# Patient Record
Sex: FEMALE | Race: WHITE | NOT HISPANIC OR LATINO | Employment: FULL TIME | URBAN - METROPOLITAN AREA
[De-identification: names, ages, dates, MRNs, and addresses within clinical notes are randomized per-mention and may not be internally consistent; named-entity substitution may affect disease eponyms.]

---

## 2017-01-06 ENCOUNTER — PATIENT MESSAGE (OUTPATIENT)
Dept: PHYSICAL MEDICINE AND REHAB | Facility: CLINIC | Age: 32
End: 2017-01-06

## 2017-01-10 ENCOUNTER — OFFICE VISIT (OUTPATIENT)
Dept: PHYSICAL MEDICINE AND REHAB | Facility: CLINIC | Age: 32
End: 2017-01-10
Payer: COMMERCIAL

## 2017-01-10 ENCOUNTER — HOSPITAL ENCOUNTER (OUTPATIENT)
Dept: RADIOLOGY | Facility: HOSPITAL | Age: 32
Discharge: HOME | End: 2017-01-10
Attending: PHYSICAL MEDICINE & REHABILITATION
Payer: COMMERCIAL

## 2017-01-10 VITALS
WEIGHT: 148.56 LBS | HEART RATE: 59 BPM | HEIGHT: 62 IN | SYSTOLIC BLOOD PRESSURE: 97 MMHG | DIASTOLIC BLOOD PRESSURE: 61 MMHG | BODY MASS INDEX: 27.34 KG/M2

## 2017-01-10 DIAGNOSIS — R29.898 WEAKNESS OF HAND: Primary | ICD-10-CM

## 2017-01-10 DIAGNOSIS — M54.2 CERVICALGIA: ICD-10-CM

## 2017-01-10 DIAGNOSIS — R29.898 WEAKNESS OF HAND: ICD-10-CM

## 2017-01-10 PROCEDURE — 72050 X-RAY EXAM NECK SPINE 4/5VWS: CPT | Mod: 26,,, | Performed by: RADIOLOGY

## 2017-01-10 PROCEDURE — 99999 PR PBB SHADOW E&M-EST. PATIENT-LVL III: CPT | Mod: PBBFAC,,, | Performed by: PHYSICAL MEDICINE & REHABILITATION

## 2017-01-10 PROCEDURE — 99213 OFFICE O/P EST LOW 20 MIN: CPT | Mod: S$GLB,,, | Performed by: PHYSICAL MEDICINE & REHABILITATION

## 2017-01-10 PROCEDURE — 72050 X-RAY EXAM NECK SPINE 4/5VWS: CPT | Mod: TC

## 2017-01-10 PROCEDURE — 1159F MED LIST DOCD IN RCRD: CPT | Mod: S$GLB,,, | Performed by: PHYSICAL MEDICINE & REHABILITATION

## 2017-01-10 RX ORDER — DICLOFENAC SODIUM 10 MG/G
4 GEL TOPICAL 4 TIMES DAILY
Qty: 2 TUBE | Refills: 6 | Status: SHIPPED | OUTPATIENT
Start: 2017-01-10 | End: 2018-04-12

## 2017-01-10 NOTE — MR AVS SNAPSHOT
[facility]  [address] [place] [address] [place] [address]  Phone: [phone]  Fax: [phone]                  Marilu Hernandez   1/10/2017 9:40 AM   Office Visit    Description:  Female : 1985   Provider:  Sharon Figueroa DO   Department:  [facility]           Reason for Visit     Migraine           Diagnoses this Visit        Comments    Weakness of hand    -  Primary     Cervicalgia                To Do List           Future Appointments        Provider Department Dept Phone    1/10/2017 10:45 AM [facility] [id number] [facility] [phone]    2017 1:00 PM [facility] MRI1 300 LB LIMIT [facility] [phone]    2017 11:40 AM Sharon Figueroa DO [facility] [phone]      Goals (5 Years of Data)     None      Follow-Up and Disposition     Return in about 4 weeks (around 2017).    Follow-up and Disposition History       These Medications        Disp Refills Start End    diclofenac sodium (VOLTAREN) 1 % Gel 2 Tube 6 1/10/2017     Apply 4 g topically 4 (four) times daily. - Topical    Pharmacy: [facility] [address] [place] [address] (N), [place] - [address] Ph #: [phone]         [facility]     [facility] On Call Nurse Care Line -  Assistance  Registered nurses in the [facility] Center provide clinical advisement, health education, appointment booking, and other advisory services.  Call for this free service at [phone].             Medications           Message regarding Medications     Verify the changes and/or additions to your medication regime listed below are the same as discussed with your clinician today.  If any of these changes or additions are incorrect, please notify your healthcare provider.        START taking these NEW medications        Refills    diclofenac sodium (VOLTAREN) 1 % Gel 6    Sig: Apply 4 g topically 4 (four) times daily.     "Class: Normal    Route: Topical           Verify that the below list of medications is an accurate representation of the medications you are currently taking.  If none reported, the list may be blank. If incorrect, please contact your healthcare provider. Carry this list with you in case of emergency.           Current Medications     diclofenac sodium (VOLTAREN) 1 % Gel Apply 4 g topically 4 (four) times daily.    ibuprofen (ADVIL,MOTRIN) 200 MG tablet Take 800 mg by mouth nightly as needed for Pain.           Clinical Reference Information           Vital Signs - Last Recorded  Most recent update: 1/10/2017  9:54 AM by Annabel López MA    BP Pulse Ht Wt BMI    97/61 (!) 59 5' 2" (1.575 m) 67.4 kg (148 lb 9.4 oz) 27.18 kg/m2      Blood Pressure          Most Recent Value    BP  97/61      Allergies as of 1/10/2017     No Known Allergies      Immunizations Administered on Date of Encounter - 1/10/2017     None      Orders Placed During Today's Visit     Future Labs/Procedures Expected by Expires    MRI Cervical Spine Without Contrast  1/10/2017 1/10/2018    X-Ray Cervical Spine Complete 5 view  1/10/2017 1/10/2018      "
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
## 2017-01-16 ENCOUNTER — HOSPITAL ENCOUNTER (OUTPATIENT)
Dept: RADIOLOGY | Facility: HOSPITAL | Age: 32
Discharge: HOME | End: 2017-01-16
Attending: PHYSICAL MEDICINE & REHABILITATION
Payer: COMMERCIAL

## 2017-01-16 DIAGNOSIS — R29.898 WEAKNESS OF HAND: ICD-10-CM

## 2017-01-16 PROCEDURE — 72141 MRI NECK SPINE W/O DYE: CPT | Mod: 26,,, | Performed by: RADIOLOGY

## 2017-01-16 PROCEDURE — 72141 MRI NECK SPINE W/O DYE: CPT | Mod: TC

## 2017-01-17 ENCOUNTER — OFFICE VISIT (OUTPATIENT)
Dept: PHYSICAL MEDICINE AND REHAB | Facility: CLINIC | Age: 32
End: 2017-01-17
Payer: COMMERCIAL

## 2017-01-17 VITALS
WEIGHT: 147.94 LBS | HEIGHT: 62 IN | DIASTOLIC BLOOD PRESSURE: 56 MMHG | HEART RATE: 72 BPM | BODY MASS INDEX: 27.22 KG/M2 | SYSTOLIC BLOOD PRESSURE: 91 MMHG

## 2017-01-17 DIAGNOSIS — M54.2 CERVICALGIA: ICD-10-CM

## 2017-01-17 PROCEDURE — 1159F MED LIST DOCD IN RCRD: CPT | Mod: S$GLB,,, | Performed by: PHYSICAL MEDICINE & REHABILITATION

## 2017-01-17 PROCEDURE — 20553 NJX 1/MLT TRIGGER POINTS 3/>: CPT | Mod: S$GLB,,, | Performed by: PHYSICAL MEDICINE & REHABILITATION

## 2017-01-17 PROCEDURE — 99999 PR PBB SHADOW E&M-EST. PATIENT-LVL II: CPT | Mod: PBBFAC,,, | Performed by: PHYSICAL MEDICINE & REHABILITATION

## 2017-01-17 PROCEDURE — 99213 OFFICE O/P EST LOW 20 MIN: CPT | Mod: 25,S$GLB,, | Performed by: PHYSICAL MEDICINE & REHABILITATION

## 2017-01-17 RX ORDER — LIDOCAINE HYDROCHLORIDE 10 MG/ML
9 INJECTION INFILTRATION; PERINEURAL ONCE
Status: COMPLETED | OUTPATIENT
Start: 2017-01-17 | End: 2017-01-17

## 2017-01-17 RX ADMIN — LIDOCAINE HYDROCHLORIDE 9 ML: 10 INJECTION INFILTRATION; PERINEURAL at 02:01

## 2017-01-17 NOTE — MR AVS SNAPSHOT
[facility]  [address] [place] [address]  [place] [address]  Phone: [phone]  Fax: [phone]                  Marilu Hernandez   2017 1:40 PM   Office Visit    Description:  Female : 1985   Provider:  Sharon Figueroa DO   Department:  [facility]           Reason for Visit     Follow-up     Neck Pain           Diagnoses this Visit        Comments    Cervicalgia                To Do List           Future Appointments        Provider Department Dept Phone    2017 11:40 AM Sharon Figueroa DO [facility] [phone]      Goals (5 Years of Data)     None      Follow-Up and Disposition     Return in about 4 weeks (around 2017).      [facility]     [facility] On Call Nurse [facility] Line - 24/7 Assistance  Registered nurses in the [facility] On Call Center provide clinical advisement, health education, appointment booking, and other advisory services.  Call for this free service at [phone].             Medications           Message regarding Medications     Verify the changes and/or additions to your medication regime listed below are the same as discussed with your clinician today.  If any of these changes or additions are incorrect, please notify your healthcare provider.        These medications were administered today        Dose Freq    lidocaine HCL 10 mg/ml (1%) injection 9 mL 9 mL Once    Si mLs by Other route once.    Class: Normal    Route: Other           Verify that the below list of medications is an accurate representation of the medications you are currently taking.  If none reported, the list may be blank. If incorrect, please contact your healthcare provider. Carry this list with you in case of emergency.           Current Medications     diclofenac sodium (VOLTAREN) 1 % Gel Apply 4 g topically 4 (four) times daily.    ibuprofen (ADVIL,MOTRIN) 200 MG tablet Take 800 mg by mouth nightly as needed for  "Pain.           Clinical Reference Information           Vital Signs - Last Recorded  Most recent update: 1/17/2017  2:11 PM by Annabel López MA    BP Pulse Ht Wt BMI    (!) 91/56 72 5' 2" (1.575 m) 67.1 kg (147 lb 14.9 oz) 27.06 kg/m2      Blood Pressure          Most Recent Value    BP  (!)  91/56      Allergies as of 1/17/2017     No Known Allergies      Immunizations Administered on Date of Encounter - 1/17/2017     None      Administrations This Visit     lidocaine HCL 10 mg/ml (1%) injection 9 mL     Admin Date Action Dose Route Administered By             01/17/2017 Given 9 mL Other Sharon Figueroa DO                      "

## 2017-01-17 NOTE — PROGRESS NOTES
HPI:  Patient is a 31 y.o. year old female w. Neck pain and headaches. Her last eval was 1/10 we recc trigger pt injections but she declined, she has now reconsidered.    IMAGING    MRI CERVICAL SPINE WITHOUT CONTRAST    COMPARISON:  None    TECHNIQUE:  Sagittal T1, T2, and STIR;  axial T2 and 3D GRE sequences through the cervical spine were acquired without contrast.      FINDINGS:  The vertebral body alignment are within normal limits.  Marrow signal is appropriate.  The cervical cord is normal in contour, caliber, and signal characteristics.    C2-3:  No disc herniation, spinal canal narrowing, or neuroforaminal narrowing.    C3-4:  There is a very small broad-based posterior disc osteophyte complex formation which results in no significant spinal canal or neuroforaminal narrowing.    C4-5:  There is small broad-based posterior disc ostomy complex formation accompanied by uncovertebral spurring, resulting in mild bilateral neuroforaminal narrowing.  There is also mild spinal canal narrowing with effacement of the ventral CSF space.  No impression upon the cord.    C5-6:  There is mild right neuroforaminal narrowing primarily due to uncovertebral spurring.    C6-7:  No disc herniation, spinal canal narrowing, or neuroforaminal narrowing.    C7-T1:  No disc herniation, spinal canal narrowing, or neuroforaminal narrowing.   Impression     Mild degenerative cervical spondylosis resulting in mild spinal canal and neuroforaminal narrowing as above.           AP, AP open-mouth odontoid, lateral, bilateral BP cervical spine obtained    The odontoid is intact.  There is no abnormal prevertebral soft tissue swelling.  There is a mild reversal of the usual cervical lordosis.  Vertebral body heights and alignment are maintained without acute compression or subluxation.  Heights of the intervertebral disc spaces appear maintained.   Impression       Reversal of the usual cervical lordosis.  No acute compression or  "subluxation.           Past Medical History   Diagnosis Date    Dizziness     Endometriosis of uterus     Gastritis     Headache     Numbness and tingling in both hands      Past Surgical History   Procedure Laterality Date    Hysterectomy       Family History   Problem Relation Age of Onset    Diabetes Maternal Grandmother      Social History     Social History    Marital status:      Spouse name: N/A    Number of children: N/A    Years of education: N/A     Social History Main Topics    Smoking status: Former Smoker    Smokeless tobacco: Never Used    Alcohol use Yes    Drug use: No    Sexual activity: Yes     Other Topics Concern    Not on file     Social History Narrative       Review of patient's allergies indicates:  No Known Allergies    Current Outpatient Prescriptions:     diclofenac sodium (VOLTAREN) 1 % Gel, Apply 4 g topically 4 (four) times daily., Disp: 2 Tube, Rfl: 6    ibuprofen (ADVIL,MOTRIN) 200 MG tablet, Take 800 mg by mouth nightly as needed for Pain., Disp: , Rfl:           Review of Systems  No nausea, vomiting, fevers, Chills , contipation, diarrhea or sweats    Physical Exam:      Vitals:    01/17/17 1409   BP: (!) 91/56   Pulse: 72   alert and oriented ×4 follows commands answers all questions appropriately,affect wnl  Manual muscle test 5 out of 5 sensation to light touch grossly intact  +excruciate tenderness b/l CERVICAL paraspinals   Antalgic gait  No C/C/E      Assessment:  Cervical myofascial pain  B/l occipital neuralgia  Mild cervical spondylosis    Plan:    Reviewed images w. Pt.  +  PROCEDURE NOTE:Risk and benefit of trigger point injections given to pt. Injections performed w. A" 25G needle after sterile prep w. Betadine, verbal consent obtained . NO complications. b/l trapezius, splenius cap and lev scapulae were inected with a total of 3ML of 1% Lidocaine    - pt. Declined occipital nerve block will leave for next encounter        "

## 2017-03-13 ENCOUNTER — OFFICE VISIT (OUTPATIENT)
Dept: PHYSICAL MEDICINE AND REHAB | Facility: CLINIC | Age: 32
End: 2017-03-13
Payer: COMMERCIAL

## 2017-03-13 VITALS
WEIGHT: 136 LBS | TEMPERATURE: 98 F | BODY MASS INDEX: 25.03 KG/M2 | DIASTOLIC BLOOD PRESSURE: 62 MMHG | HEART RATE: 76 BPM | HEIGHT: 62 IN | SYSTOLIC BLOOD PRESSURE: 98 MMHG

## 2017-03-13 DIAGNOSIS — R42 VERTIGO: Primary | ICD-10-CM

## 2017-03-13 DIAGNOSIS — M54.81 BILATERAL OCCIPITAL NEURALGIA: ICD-10-CM

## 2017-03-13 PROCEDURE — 99999 PR PBB SHADOW E&M-EST. PATIENT-LVL III: CPT | Mod: PBBFAC,,, | Performed by: PHYSICAL MEDICINE & REHABILITATION

## 2017-03-13 PROCEDURE — 1160F RVW MEDS BY RX/DR IN RCRD: CPT | Mod: S$GLB,,, | Performed by: PHYSICAL MEDICINE & REHABILITATION

## 2017-03-13 PROCEDURE — 99214 OFFICE O/P EST MOD 30 MIN: CPT | Mod: 25,S$GLB,, | Performed by: PHYSICAL MEDICINE & REHABILITATION

## 2017-03-13 PROCEDURE — 64405 NJX AA&/STRD GR OCPL NRV: CPT | Mod: 50,S$GLB,, | Performed by: PHYSICAL MEDICINE & REHABILITATION

## 2017-03-13 RX ORDER — LIDOCAINE HYDROCHLORIDE 10 MG/ML
3 INJECTION INFILTRATION; PERINEURAL
Status: COMPLETED | OUTPATIENT
Start: 2017-03-13 | End: 2017-03-13

## 2017-03-13 RX ADMIN — LIDOCAINE HYDROCHLORIDE 3 ML: 10 INJECTION INFILTRATION; PERINEURAL at 08:03

## 2017-03-13 NOTE — MR AVS SNAPSHOT
[facility]  [address] [place] [address]  [place] [address]  Phone: [phone]  Fax: [phone]                  Marilu Hernandez   3/13/2017 8:00 AM   Office Visit    Description:  Female : 1985   Provider:  Sharon Figueroa DO   Department:  [facility] Med/Rehab           Reason for Visit     Follow-up           Diagnoses this Visit        Comments    Vertigo    -  Primary     Bilateral occipital neuralgia                To Do List           Future Appointments        Provider Department Dept Phone    2017 8:00 AM Sharon Figueroa DO [facility] Med[facility]Rehab [phone]      Goals (5 Years of Data)     None      [facility]     [facility] Nurse [facility] Line -  Assistance  Registered nurses in the [facility] On Call Center provide clinical advisement, health education, appointment booking, and other advisory services.  Call for this free service at [phone].             Medications           Message regarding Medications     Verify the changes and/or additions to your medication regime listed below are the same as discussed with your clinician today.  If any of these changes or additions are incorrect, please notify your healthcare provider.        These medications were administered today        Dose Freq    lidocaine HCL 10 mg/ml (1%) injection 3 mL 3 mL Clinic/HOD 1 time    Sig: 3 mLs by Other route one time.    Class: Normal    Route: Other           Verify that the below list of medications is an accurate representation of the medications you are currently taking.  If none reported, the list may be blank. If incorrect, please contact your healthcare provider. Carry this list with you in case of emergency.           Current Medications     diclofenac sodium (VOLTAREN) 1 % Gel Apply 4 g topically 4 (four) times daily.    ibuprofen (ADVIL,MOTRIN) 200 MG tablet Take 800 mg by mouth nightly as needed for Pain.           Clinical  "Reference Information           Your Vitals Were     BP Pulse Temp Height Weight BMI    98/62 (BP Location: Right arm, Patient Position: Sitting, BP Method: Automatic) 76 98.1 °F (36.7 °C) 5' 2" (1.575 m) 61.7 kg (136 lb 0.4 oz) 24.88 kg/m2      Blood Pressure          Most Recent Value    BP  98/62      Allergies as of 3/13/2017     No Known Allergies      Immunizations Administered on Date of Encounter - 3/13/2017     None      Orders Placed During Today's Visit     Future Labs/Procedures Expected by Expires    MRI Brain With Contrast  3/13/2017 3/13/2018      Administrations This Visit     lidocaine HCL 10 mg/ml (1%) injection 3 mL     Admin Date Action Dose Route Administered By             03/13/2017 Given 3 mL Other Sharon Figueroa, DO                      Language Assistance Services     ATTENTION: Language assistance services are available, free of charge. Please call [phone].      ATENCIÓN: Si habla chevy, tiene a gregory disposición servicios gratuitos de asistencia lingüística. Llame al [phone].     CHÚ Ý: N?u b?n nói Ti?ng Vi?t, có các d?ch v? h? tr? ngôn ng? mi?n phí dành cho b?n. G?i s? [phone].         [facility] complies with applicable Federal civil rights laws and does not discriminate on the basis of race, color, national origin, age, disability, or sex.        "

## 2017-03-13 NOTE — PROGRESS NOTES
HPI:  Patient is a 31 y.o. year old female w. Neck pain and headaches. Neck pain improved after trigger pt injections. Pt. Recently had an episode of severe vertigo which lasted for 2 days and lightheadedness. It was previously noted in an MRI of the brain she had some lesions unspecified. She is requesting a repeat mri of brain.     Comparison study: 7/26/2013    Ultrasound was multiplanar imaging of the brain was obtained without and with contrast.  6 cc gadovist was injected intravenously, MS protocol with patient returning to the Department for sagittal T2 flair following the initial images.    There is no restricted diffusion suggesting acute infarct.  The craniocervical junction is normal in appearance.  There is normal vascular flow void in major, central intracranial vessels.  Ventricles, sulci, fissures are unremarkable in appearance for the patient's age..  There is no abnormal contrast enhancement or intracranial mass or mass effect.    There are few small foci of increased signal in the white matter on the FLAIR sequence.  There is one on the axial images on the left series 8 image 6 today that is not definitively seen on prior axial images but on sagittal CT images do not appear significant changed, and also not significantly changed compared to earlier exam 1/30/2013   Impression       Small foci of the increased signal white matter on flair sequence not significantly changed compared to prior exams differential including demyelinating process such as multiple sclerosis and microvascular ischemic change              ______________________________________     Electronically signed by: RUDY FRANCE MD  Date: 06/14/16  Time: 12:12     Encounter   View Encounter            Past Medical History:   Diagnosis Date    Dizziness     Endometriosis of uterus     Gastritis     Headache     Numbness and tingling in both hands      Past Surgical History:   Procedure Laterality Date    HYSTERECTOMY    "    Family History   Problem Relation Age of Onset    Diabetes Maternal Grandmother      Social History     Social History    Marital status:      Spouse name: N/A    Number of children: N/A    Years of education: N/A     Social History Main Topics    Smoking status: Former Smoker    Smokeless tobacco: Never Used    Alcohol use Yes    Drug use: No    Sexual activity: Yes     Other Topics Concern    None     Social History Narrative       Review of patient's allergies indicates:  No Known Allergies    Current Outpatient Prescriptions:     diclofenac sodium (VOLTAREN) 1 % Gel, Apply 4 g topically 4 (four) times daily., Disp: 2 Tube, Rfl: 6    ibuprofen (ADVIL,MOTRIN) 200 MG tablet, Take 800 mg by mouth nightly as needed for Pain., Disp: , Rfl:           Review of Systems  No nausea, vomiting, fevers, Chills , contipation, diarrhea or sweats    Physical Exam:      Vitals:    03/13/17 0813   BP: 98/62   Pulse: 76   Temp: 98.1 °F (36.7 °C)       alert and oriented ×4 follows commands answers all questions appropriately,affect wnl  Manual muscle test 5 out of 5 sensation to light touch grossly intact  +excruciate tenderness b/l occiput  Antalgic gait  No C/C/E      Assessment:  Cervical myosfascial pain improved after trigger pt  B/l occipital neuralagi    Plan:  Occipital nerve blocks  Mri brain repeat d/t lesions and f/u imaging w. neuro    PROCEDURE NOTE:   Risk and benefits of occipital nerve block reviewed with patient. Injections performed after verbal consent obtained. A 25G 1.5" needle utilized.  B/l 0cciput was injected with a total of 3 ml of 1% lidocaine. No complications      "

## 2017-04-13 ENCOUNTER — OFFICE VISIT (OUTPATIENT)
Dept: PHYSICAL MEDICINE AND REHAB | Facility: CLINIC | Age: 32
End: 2017-04-13
Payer: COMMERCIAL

## 2017-04-13 VITALS
BODY MASS INDEX: 26.33 KG/M2 | HEIGHT: 62 IN | HEART RATE: 76 BPM | DIASTOLIC BLOOD PRESSURE: 79 MMHG | WEIGHT: 143.06 LBS | TEMPERATURE: 98 F | SYSTOLIC BLOOD PRESSURE: 113 MMHG

## 2017-04-13 DIAGNOSIS — R51.9 HEADACHE, UNSPECIFIED HEADACHE TYPE: Primary | ICD-10-CM

## 2017-04-13 PROCEDURE — 99213 OFFICE O/P EST LOW 20 MIN: CPT | Mod: S$GLB,,, | Performed by: PHYSICAL MEDICINE & REHABILITATION

## 2017-04-13 PROCEDURE — 99999 PR PBB SHADOW E&M-EST. PATIENT-LVL III: CPT | Mod: PBBFAC,,, | Performed by: PHYSICAL MEDICINE & REHABILITATION

## 2017-04-13 PROCEDURE — 1160F RVW MEDS BY RX/DR IN RCRD: CPT | Mod: S$GLB,,, | Performed by: PHYSICAL MEDICINE & REHABILITATION

## 2017-04-13 NOTE — MR AVS SNAPSHOT
[facility]  [address] [place] [address] [place] [address]  Phone: [phone]  Fax: [phone]                  Marilu Hernandez   2017 8:00 AM   Office Visit    Description:  Female : 1985   Provider:  Sharon Figueroa DO   Department:  [facility] Med[facility]Rehab           Reason for Visit     Follow-up                To Do List           Future Appointments        Provider Department Dept Phone    2017 8:30 AM [facility] MRI1 300 LB LIMIT [facility] [phone]    2017 9:40 AM Cisco Coles Jr., MD [facility] Neurology [phone]    2017 8:00 AM Sharon Figueroa DO [facility] [phone]      Goals (5 Years of Data)     None      [facility]     [facility] Nurse Care Line -  Assistance  Unless otherwise directed by your provider, please contact [facility], our nurse care line that is available for  assistance.     Registered nurses in the [facility] Center provide: appointment scheduling, clinical advisement, health education, and other advisory services.  Call: [phone] (toll free)               Medications           Message regarding Medications     Verify the changes and/or additions to your medication regime listed below are the same as discussed with your clinician today.  If any of these changes or additions are incorrect, please notify your healthcare provider.             Verify that the below list of medications is an accurate representation of the medications you are currently taking.  If none reported, the list may be blank. If incorrect, please contact your healthcare provider. Carry this list with you in case of emergency.           Current Medications     diclofenac sodium (VOLTAREN) 1 % Gel Apply 4 g topically 4 (four) times daily.    ibuprofen (ADVIL,MOTRIN) 200 MG tablet Take 800 mg by mouth nightly as needed for Pain.           Clinical Reference  "Information           Your Vitals Were     BP Pulse Temp Height Weight BMI    113/79 76 98.1 °F (36.7 °C) 5' 2" (1.575 m) 64.9 kg (143 lb 1.3 oz) 26.17 kg/m2      Blood Pressure          Most Recent Value    BP  113/79      Allergies as of 4/13/2017     No Known Allergies      Immunizations Administered on Date of Encounter - 4/13/2017     None      Language Assistance Services     ATTENTION: Language assistance services are available, free of charge. Please call [phone].      ATENCIÓN: Si habla español, tiene a gregory disposición servicios gratuitos de asistencia lingüística. Llame al [phone].     EDMAR Ý: N?u b?n nói Ti?ng Vi?t, có các d?ch v? h? tr? ngôn ng? mi?n phí dành cho b?n. G?i s? [phone].         [facility] complies with applicable Federal civil rights laws and does not discriminate on the basis of race, color, national origin, age, disability, or sex.        "

## 2017-04-13 NOTE — PROGRESS NOTES
HPI:  Patient is a 31 y.o. year old female w. Recurrent headaches and abnormality on previous MRI of brain. She has followed neurology who does not think she has MS . Radiology did recommend monitoring plaques on mri of brain. We had ordered an mri but it was not scheuled. Headaches improved 75% w. B/l occipital nerve blocks.      Past Medical History:   Diagnosis Date    Dizziness     Endometriosis of uterus     Gastritis     Headache     Numbness and tingling in both hands      Past Surgical History:   Procedure Laterality Date    HYSTERECTOMY       Family History   Problem Relation Age of Onset    Diabetes Maternal Grandmother      Social History     Social History    Marital status:      Spouse name: N/A    Number of children: N/A    Years of education: N/A     Social History Main Topics    Smoking status: Former Smoker    Smokeless tobacco: Never Used    Alcohol use Yes    Drug use: No    Sexual activity: Yes     Other Topics Concern    None     Social History Narrative       Review of patient's allergies indicates:  No Known Allergies    Current Outpatient Prescriptions:     diclofenac sodium (VOLTAREN) 1 % Gel, Apply 4 g topically 4 (four) times daily., Disp: 2 Tube, Rfl: 6    ibuprofen (ADVIL,MOTRIN) 200 MG tablet, Take 800 mg by mouth nightly as needed for Pain., Disp: , Rfl:     Review of Systems  No nausea, vomiting, fevers, Chills , contipation, diarrhea or sweats      Physical Exam:      Vitals:    04/13/17 0812   BP: 113/79   Pulse: 76   Temp: 98.1 °F (36.7 °C)   alert and oriented ×4 follows commands answers all questions appropriately,affect wnl  Manual muscle test 5 out of 5 sensation to light touch grossly intact  +mild tenderness b/l cervical paraspinals, no tenderness of b/l occiput  Nl gait  No C/C/E      Assessment  b/l occipital neuralgia-  Improved after blocks  Abnormal mri of brain    Plan:  Schedule mri of brain  She declined any more injections as she has needle  phobia and her pain is much improved

## 2017-04-19 DIAGNOSIS — R51.9 HEADACHE, UNSPECIFIED HEADACHE TYPE: Primary | ICD-10-CM

## 2017-04-21 ENCOUNTER — TELEPHONE (OUTPATIENT)
Dept: PHYSICAL MEDICINE AND REHAB | Facility: CLINIC | Age: 32
End: 2017-04-21

## 2017-04-21 ENCOUNTER — HOSPITAL ENCOUNTER (OUTPATIENT)
Dept: RADIOLOGY | Facility: HOSPITAL | Age: 32
Discharge: HOME | End: 2017-04-21
Attending: PHYSICAL MEDICINE & REHABILITATION
Payer: COMMERCIAL

## 2017-04-21 DIAGNOSIS — R51.9 HEADACHE, UNSPECIFIED HEADACHE TYPE: ICD-10-CM

## 2017-04-21 PROCEDURE — 70553 MRI BRAIN STEM W/O & W/DYE: CPT | Mod: 26,,, | Performed by: RADIOLOGY

## 2017-04-21 PROCEDURE — 70553 MRI BRAIN STEM W/O & W/DYE: CPT | Mod: TC

## 2017-04-21 PROCEDURE — 25500020 PHARM REV CODE 255: Performed by: PHYSICAL MEDICINE & REHABILITATION

## 2017-04-21 PROCEDURE — A9585 GADOBUTROL INJECTION: HCPCS | Performed by: PHYSICAL MEDICINE & REHABILITATION

## 2017-04-21 RX ORDER — GADOBUTROL 604.72 MG/ML
INJECTION INTRAVENOUS
Status: DISPENSED
Start: 2017-04-21 | End: 2017-04-21

## 2017-04-21 RX ORDER — GADOBUTROL 604.72 MG/ML
6 INJECTION INTRAVENOUS
Status: COMPLETED | OUTPATIENT
Start: 2017-04-21 | End: 2017-04-21

## 2017-04-21 RX ADMIN — GADOBUTROL 6 ML: 604.72 INJECTION INTRAVENOUS at 09:04

## 2017-04-21 NOTE — TELEPHONE ENCOUNTER
----- Message from Ida Solano sent at 4/20/2017  8:31 AM CDT -----  Contact: pt [phone]  Call placed to pod patient called and states she is returning a call back from your office yesterday about her MRI for tomorrow

## 2017-05-23 ENCOUNTER — TELEPHONE (OUTPATIENT)
Dept: NEUROLOGY | Facility: CLINIC | Age: 32
End: 2017-05-23

## 2017-06-06 ENCOUNTER — TELEPHONE (OUTPATIENT)
Dept: NEUROLOGY | Facility: CLINIC | Age: 32
End: 2017-06-06

## 2017-07-07 ENCOUNTER — OFFICE VISIT (OUTPATIENT)
Dept: PHYSICAL MEDICINE AND REHAB | Facility: CLINIC | Age: 32
End: 2017-07-07
Payer: COMMERCIAL

## 2017-07-07 VITALS
HEIGHT: 62 IN | SYSTOLIC BLOOD PRESSURE: 98 MMHG | BODY MASS INDEX: 27.49 KG/M2 | DIASTOLIC BLOOD PRESSURE: 67 MMHG | WEIGHT: 149.38 LBS | HEART RATE: 77 BPM

## 2017-07-07 DIAGNOSIS — M79.10 MYALGIA: ICD-10-CM

## 2017-07-07 DIAGNOSIS — M54.2 CERVICALGIA: ICD-10-CM

## 2017-07-07 DIAGNOSIS — M54.81 OCCIPITAL NEURALGIA OF LEFT SIDE: ICD-10-CM

## 2017-07-07 PROCEDURE — 99214 OFFICE O/P EST MOD 30 MIN: CPT | Mod: 25,S$GLB,, | Performed by: PHYSICAL MEDICINE & REHABILITATION

## 2017-07-07 PROCEDURE — 20553 NJX 1/MLT TRIGGER POINTS 3/>: CPT | Mod: 59,S$GLB,, | Performed by: PHYSICAL MEDICINE & REHABILITATION

## 2017-07-07 PROCEDURE — 64405 NJX AA&/STRD GR OCPL NRV: CPT | Mod: LT,S$GLB,, | Performed by: PHYSICAL MEDICINE & REHABILITATION

## 2017-07-07 PROCEDURE — 99999 PR PBB SHADOW E&M-EST. PATIENT-LVL III: CPT | Mod: PBBFAC,,, | Performed by: PHYSICAL MEDICINE & REHABILITATION

## 2017-07-07 RX ORDER — LORAZEPAM 0.5 MG/1
0.5 TABLET ORAL 2 TIMES DAILY PRN
COMMUNITY
End: 2018-04-12 | Stop reason: SDUPTHER

## 2017-07-07 RX ORDER — ZOLPIDEM TARTRATE 5 MG/1
5 TABLET ORAL NIGHTLY PRN
COMMUNITY
End: 2018-04-12 | Stop reason: SDUPTHER

## 2017-07-07 RX ORDER — LIDOCAINE HYDROCHLORIDE 10 MG/ML
6 INJECTION INFILTRATION; PERINEURAL ONCE
Status: COMPLETED | OUTPATIENT
Start: 2017-07-07 | End: 2017-07-07

## 2017-07-07 RX ADMIN — LIDOCAINE HYDROCHLORIDE 6 ML: 10 INJECTION INFILTRATION; PERINEURAL at 12:07

## 2017-07-07 NOTE — PROGRESS NOTES
HPI:  Patient is a 32 y.o. year old female w. Neck pain and headaches. She responds very well w. Trigger pt injections and occipital nerve blocks. The headache is primarily on the left side. Her last injections were done in April. I also ordered an mri of brain to follow up on previous abnormal one suggesting MS. [facility]    Sagittal and axial images are obtained with T1 weighting.  Additional axial images are obtained with T2, flair, diffusion weighting and ADC map.  Following the administration of IV gadolinium contrast 6mls, axial and coronal images are obtained.    The general brain anatomy appears within normal limits.  There is normal medulla, alexandro, brainstem, basal ganglia, corpus callosum, cerebral and cerebellar lobar structure.  The pituitary is not enlarged.  The internal auditory canals are sharp.  The basal cisterns are clear and symmetric.    There is no mass-effect or midline shift.  The ventricles are of normal size and symmetric.  The gray-white matter differentiation is normal.  Within the brain parenchyma there is a 5 mm focus of elevated signal intensity on flair weighting in the deep white matter of the fact that  left frontal lobe.  There are few much smaller 2-3 mm lesions in both frontal lobes more peripherally.  These do not see display acute signal on diffusion weighting and did not have contrast enhancement.  The appearance is most consistent with deep white matter ischemic changes.  In this age group however multiple sclerosis is not ruled out.  Other focal lesions particularly in the periventricular area however are not seen.    Within the rest of the brain parenchyma hyper or hypodense areas consistent with tumor, CVA, edema or hemorrhage are not seen.  Following administration of gadolinium contrast, no abnormal areas of contrast enhancement are seen.   Impression     A 5 mm focus and several 2-3 mm foci of elevated flair signal in the frontal lobes bilaterally  are most consistent with deep white matter ischemic changes.  In this age group however multiple sclerosis is not ruled out.  No acute lesions or contrast enhancing lesions are seen.  The rest of MRI of the brain with and without contrast is negative       Sagittal and axial images are obtained with T1 weighting.  Additional axial images are obtained with T2, flair, diffusion weighting and ADC map.  Following the administration of IV Multihance gadolinium contrast 15mls, axial and coronal images are   obtained.    The general brain anatomy appears within normal limits.  There is normal medulla, alexandro, brainstem, basal ganglia, corpus callosum, cerebral and cerebellar lobar structure.  The pituitary is not enlarged.  The internal auditory canals are sharp.  The   basal cisterns are clear and symmetric.    There is no mass-effect or midline shift.  The ventricles are of normal size and symmetric.  The gray-white matter differentiation is normal.  Within the brain parenchyma two foci of elevated signal intensity, one in the right frontal lobe at the   gray-white matter junction and one in the deep white matter of the left frontal lobe are identified.  These are best seen on flair weighted imaging.  Neither show acute signal on diffusion weighting.  These could represent small foci of deep white matter   ischemic change but multiple sclerosis cannot be ruled out.      Other focal lesions including around the corpus callosum are not seen..  Larger areas of increased or decreased signal intensity consistent with tumor, CVA or hemorrhage is not seen.  Following administration of gadolinium contrast, no abnormal areas of contrast enhancement are seen.   Impression      Two small foci of abnormal signal intensity one in each frontal lobe are identified.  These could represent deep white matter ischemic changes but multiple sclerosis is not ruled out.  The rest MRI of the brain with and without contrast is negative            Past Medical History:   Diagnosis Date    Dizziness     Endometriosis of uterus     Gastritis     Headache     Numbness and tingling in both hands      Past Surgical History:   Procedure Laterality Date    HYSTERECTOMY       Family History   Problem Relation Age of Onset    Diabetes Maternal Grandmother      Social History     Social History    Marital status:      Spouse name: N/A    Number of children: N/A    Years of education: N/A     Social History Main Topics    Smoking status: Former Smoker    Smokeless tobacco: Never Used    Alcohol use Yes    Drug use: No    Sexual activity: Yes     Other Topics Concern    Not on file     Social History Narrative    No narrative on file       Review of patient's allergies indicates:  No Known Allergies    Current Outpatient Prescriptions:     diclofenac sodium (VOLTAREN) 1 % Gel, Apply 4 g topically 4 (four) times daily., Disp: 2 Tube, Rfl: 6    ibuprofen (ADVIL,MOTRIN) 200 MG tablet, Take 800 mg by mouth nightly as needed for Pain., Disp: , Rfl:     lorazepam (ATIVAN) 0.5 MG tablet, Take 0.5 mg by mouth every 6 (six) hours as needed for Anxiety., Disp: , Rfl:     zolpidem (AMBIEN) 5 MG Tab, Take 5 mg by mouth nightly as needed., Disp: , Rfl:         Review of Systems  No nausea, vomiting, fevers, Chills , contipation, diarrhea or sweats      Physical Exam:      Vitals:    07/07/17 0809   BP: 98/67   Pulse: 77     alert and oriented ×4 follows commands answers all questions appropriately,affect wnl  Manual muscle test 5 out of 5 sensation to light touch grossly intact  +excruciate tenderness b/l cervical paraspinals and b/l occiput  nl gait  No C/C/E    Assessment:  Cervicalgia myofascial  Left occipital neuralgia  Abnormal mri suggestive of MS    Plan:    Trigger pt injections today  Left occipital nerve block  Follow up w.neuro regarding abnormal lesions  Schedule occipital nerve block+hydrodissection under US since she is only getting  "temporary relief from the above wout guidance- will do one side at a time      PROCEDURE NOTE:Risk and benefit of trigger point injections given to pt. Injections performed w. A" 25G needle after sterile prep w. Betadine, verbal consent obtained . NO complications.b/l trapezius, splenius cap and lev scapulae were inected with a total of 3ML of 1% Lidocaine    PROCEDURE NOTE:   Risk and benefits of occipital nerve block reviewed with patient. Injections performed after verbal consent obtained. A 25G 1.5" needle utilized.  The left occiput was injected with a total of 3 ml of 1% lidocaine. No complications        "

## 2017-10-09 PROBLEM — F51.01 PRIMARY INSOMNIA: Status: ACTIVE | Noted: 2017-10-09

## 2017-10-09 PROBLEM — F41.9 ANXIETY: Status: ACTIVE | Noted: 2017-10-09

## 2018-04-12 ENCOUNTER — OFFICE VISIT (OUTPATIENT)
Dept: PRIMARY CARE CLINIC | Facility: CLINIC | Age: 33
End: 2018-04-12
Payer: COMMERCIAL

## 2018-04-12 VITALS
BODY MASS INDEX: 26.31 KG/M2 | OXYGEN SATURATION: 99 % | HEIGHT: 62 IN | WEIGHT: 143 LBS | DIASTOLIC BLOOD PRESSURE: 67 MMHG | TEMPERATURE: 98 F | SYSTOLIC BLOOD PRESSURE: 107 MMHG | RESPIRATION RATE: 18 BRPM | HEART RATE: 62 BPM

## 2018-04-12 DIAGNOSIS — Z00.00 ANNUAL PHYSICAL EXAM: ICD-10-CM

## 2018-04-12 DIAGNOSIS — F41.9 ANXIETY: Primary | ICD-10-CM

## 2018-04-12 DIAGNOSIS — F51.01 PRIMARY INSOMNIA: ICD-10-CM

## 2018-04-12 DIAGNOSIS — Z13.6 ENCOUNTER FOR SCREENING FOR CARDIOVASCULAR DISORDERS: ICD-10-CM

## 2018-04-12 PROCEDURE — 99213 OFFICE O/P EST LOW 20 MIN: CPT | Mod: S$GLB,,, | Performed by: FAMILY MEDICINE

## 2018-04-12 PROCEDURE — 99999 PR PBB SHADOW E&M-EST. PATIENT-LVL III: CPT | Mod: PBBFAC,,, | Performed by: FAMILY MEDICINE

## 2018-04-12 RX ORDER — LORAZEPAM 0.5 MG/1
0.5 TABLET ORAL 2 TIMES DAILY PRN
Qty: 60 TABLET | Refills: 2 | Status: SHIPPED | OUTPATIENT
Start: 2018-04-12 | End: 2018-07-21

## 2018-04-12 RX ORDER — ZOLPIDEM TARTRATE 5 MG/1
5 TABLET ORAL NIGHTLY PRN
Qty: 30 TABLET | Refills: 2 | Status: SHIPPED | OUTPATIENT
Start: 2018-04-12 | End: 2018-07-21

## 2018-04-12 NOTE — PROGRESS NOTES
"Subjective:       Patient ID: Marilu London is a 32 y.o. female.    Chief Complaint: Medication Refill    Chronic, stable anxiety and insomnia.  Takes Ambien and Ativan intermittently, but is currently out.  Recently got a promotion at work and has been under increased stress, so has been having increasing anxiety/panic attacks and insomnia.  Requesting refill of medications.  Otherwise feeling well.      Review of Systems   Constitutional: Negative for chills and fever.   Respiratory: Negative for shortness of breath.    Cardiovascular: Negative for chest pain.   Gastrointestinal: Negative for nausea and vomiting.   Psychiatric/Behavioral: Positive for sleep disturbance. The patient is nervous/anxious.        Objective:      Vitals:    04/12/18 1147   BP: 107/67   BP Location: Left arm   Patient Position: Sitting   BP Method: Medium (Automatic)   Pulse: 62   Resp: 18   Temp: 98.2 °F (36.8 °C)   TempSrc: Oral   SpO2: 99%   Weight: 64.9 kg (143 lb)   Height: 5' 2" (1.575 m)     Physical Exam   Constitutional: She is oriented to person, place, and time. She appears well-developed and well-nourished.   HENT:   Head: Normocephalic and atraumatic.   Neck: Neck supple. No JVD present.   Cardiovascular: Normal rate, regular rhythm and normal heart sounds.    Pulmonary/Chest: Effort normal and breath sounds normal.   Musculoskeletal: She exhibits no edema.   Neurological: She is alert and oriented to person, place, and time.   Skin: Skin is warm and dry.   Psychiatric: She has a normal mood and affect. Her behavior is normal.   Nursing note and vitals reviewed.      Assessment:       1. Anxiety    2. Primary insomnia    3. Annual physical exam    4. Encounter for screening for cardiovascular disorders        Plan:       Anxiety  -     LORazepam (ATIVAN) 0.5 MG tablet; Take 1 tablet (0.5 mg total) by mouth 2 (two) times daily as needed for Anxiety.  Dispense: 60 tablet; Refill: 2  -     TSH; Future; Expected date: " 04/12/2018    Primary insomnia  -     zolpidem (AMBIEN) 5 MG Tab; Take 1 tablet (5 mg total) by mouth nightly as needed.  Dispense: 30 tablet; Refill: 2  -     TSH; Future; Expected date: 04/12/2018    Annual physical exam  -     CBC auto differential; Future; Expected date: 04/12/2018  -     Comprehensive metabolic panel; Future; Expected date: 04/12/2018  -     Lipid panel; Future; Expected date: 04/12/2018  -     TSH; Future; Expected date: 04/12/2018    Encounter for screening for cardiovascular disorders  -     Lipid panel; Future; Expected date: 04/12/2018      Medication List with Changes/Refills   Changed and/or Refilled Medications    Modified Medication Previous Medication    LORAZEPAM (ATIVAN) 0.5 MG TABLET lorazepam (ATIVAN) 0.5 MG tablet       Take 1 tablet (0.5 mg total) by mouth 2 (two) times daily as needed for Anxiety.    Take 0.5 mg by mouth 2 (two) times daily as needed for Anxiety.     ZOLPIDEM (AMBIEN) 5 MG TAB zolpidem (AMBIEN) 5 MG Tab       Take 1 tablet (5 mg total) by mouth nightly as needed.    Take 5 mg by mouth nightly as needed.   Discontinued Medications    DICLOFENAC SODIUM (VOLTAREN) 1 % GEL    Apply 4 g topically 4 (four) times daily.    IBUPROFEN (ADVIL,MOTRIN) 200 MG TABLET    Take 800 mg by mouth nightly as needed for Pain.

## 2018-08-28 ENCOUNTER — TELEPHONE (OUTPATIENT)
Dept: PRIMARY CARE CLINIC | Facility: CLINIC | Age: 33
End: 2018-08-28

## 2018-08-28 RX ORDER — SULFAMETHOXAZOLE AND TRIMETHOPRIM 800; 160 MG/1; MG/1
1 TABLET ORAL 2 TIMES DAILY
Qty: 14 TABLET | Refills: 0 | Status: SHIPPED | OUTPATIENT
Start: 2018-08-28 | End: 2018-09-04

## 2018-08-28 NOTE — TELEPHONE ENCOUNTER
Patient is having urinary frequency and burning. She is asking for antibiotics to be sent to [facility]

## 2018-08-28 NOTE — TELEPHONE ENCOUNTER
----- Message from Rosalind Carlos sent at 8/28/2018  4:08 PM CDT -----  Contact: Patient  Type: Needs Medical Advice    Who Called:  Marilu, patient  Symptoms (please be specific):  UTI  How long has patient had these symptoms:  2 days  Pharmacy name and phone #:    [facility] - [place] - [address]  [address] [place] [place] [address]  Phone: [phone] Fax: [phone]  Best Call Back Number: [phone]  Additional Information: Calling to ask if she can get a prescription for a urinary tract infection. Please advise. Thanks.

## 2018-10-01 RX ORDER — SULFAMETHOXAZOLE AND TRIMETHOPRIM 800; 160 MG/1; MG/1
TABLET ORAL
Qty: 14 TABLET | Refills: 0 | OUTPATIENT
Start: 2018-10-01

## 2018-10-02 ENCOUNTER — TELEPHONE (OUTPATIENT)
Dept: PRIMARY CARE CLINIC | Facility: CLINIC | Age: 33
End: 2018-10-02

## 2018-10-02 NOTE — TELEPHONE ENCOUNTER
----- Message from Juan Monae sent at 10/2/2018 11:40 AM CDT -----  Contact: pt  Type: Needs Medical Advice    Who Called:  pt  Symptoms (please be specific):  Possible UTI  How long has patient had these symptoms:  2 days  Pharmacy name and phone #:    [facility] - [place] - [address]  [address] [place] [address]  Phone: [phone] Fax: [phone]    Best Call Back Number: [phone]   Additional Information: pt is requesting antibiotics to be called in

## 2018-10-02 NOTE — TELEPHONE ENCOUNTER
Patient needs an appointment. Tried calling patient to get her in with Mariluz but no answer and no VM set up

## 2018-10-04 ENCOUNTER — TELEPHONE (OUTPATIENT)
Dept: PRIMARY CARE CLINIC | Facility: CLINIC | Age: 33
End: 2018-10-04

## 2018-10-04 RX ORDER — SULFAMETHOXAZOLE AND TRIMETHOPRIM 800; 160 MG/1; MG/1
1 TABLET ORAL 2 TIMES DAILY
Qty: 14 TABLET | Refills: 0 | Status: SHIPPED | OUTPATIENT
Start: 2018-10-04 | End: 2018-10-11

## 2018-10-04 NOTE — TELEPHONE ENCOUNTER
Patient notified that she needed to make an appointment. She has not been seen in a while and last time she was treated without being seen. She says she cannot come in because she is the only one at her job. The soonest she can come would be next week. She is asking for a rx to be sent to the pharmacy

## 2018-10-04 NOTE — TELEPHONE ENCOUNTER
----- Message from Nilda Grossman sent at 10/4/2018 11:39 AM CDT -----  Contact: self  Patient has a UTI again and and has called before to get you to [facility] for her, it is hurting and burning.   Please call in a antibiotic and then call the patient to let her know at [phone] (home).  Thanks    [facility] [place] - [address] [place] [address]  Phone: [phone] Fax: [phone]

## 2018-10-17 RX ORDER — SULFAMETHOXAZOLE AND TRIMETHOPRIM 800; 160 MG/1; MG/1
TABLET ORAL
Qty: 14 TABLET | Refills: 0 | OUTPATIENT
Start: 2018-10-17

## 2019-03-19 ENCOUNTER — OFFICE VISIT (OUTPATIENT)
Dept: PRIMARY CARE CLINIC | Facility: CLINIC | Age: 34
End: 2019-03-19
Payer: COMMERCIAL

## 2019-03-19 ENCOUNTER — TELEPHONE (OUTPATIENT)
Dept: PRIMARY CARE CLINIC | Facility: CLINIC | Age: 34
End: 2019-03-19

## 2019-03-19 VITALS
HEIGHT: 62 IN | BODY MASS INDEX: 25.21 KG/M2 | OXYGEN SATURATION: 99 % | HEART RATE: 72 BPM | RESPIRATION RATE: 16 BRPM | WEIGHT: 137 LBS | TEMPERATURE: 99 F | SYSTOLIC BLOOD PRESSURE: 109 MMHG | DIASTOLIC BLOOD PRESSURE: 63 MMHG

## 2019-03-19 DIAGNOSIS — G43.009 MIGRAINE WITHOUT AURA AND WITHOUT STATUS MIGRAINOSUS, NOT INTRACTABLE: Primary | ICD-10-CM

## 2019-03-19 PROCEDURE — 99999 PR PBB SHADOW E&M-EST. PATIENT-LVL III: ICD-10-PCS | Mod: PBBFAC,,, | Performed by: FAMILY MEDICINE

## 2019-03-19 PROCEDURE — 99214 PR OFFICE/OUTPT VISIT, EST, LEVL IV, 30-39 MIN: ICD-10-PCS | Mod: S$GLB,,, | Performed by: FAMILY MEDICINE

## 2019-03-19 PROCEDURE — 99999 PR PBB SHADOW E&M-EST. PATIENT-LVL III: CPT | Mod: PBBFAC,,, | Performed by: FAMILY MEDICINE

## 2019-03-19 PROCEDURE — 99214 OFFICE O/P EST MOD 30 MIN: CPT | Mod: S$GLB,,, | Performed by: FAMILY MEDICINE

## 2019-03-19 RX ORDER — DESVENLAFAXINE SUCCINATE 50 MG/1
100 TABLET, EXTENDED RELEASE ORAL DAILY
COMMUNITY
End: 2023-11-07

## 2019-03-19 RX ORDER — DEXTROAMPHETAMINE SACCHARATE, AMPHETAMINE ASPARTATE, DEXTROAMPHETAMINE SULFATE AND AMPHETAMINE SULFATE 2.5; 2.5; 2.5; 2.5 MG/1; MG/1; MG/1; MG/1
10 TABLET ORAL DAILY
COMMUNITY

## 2019-03-19 RX ORDER — BUTALBITAL, ACETAMINOPHEN AND CAFFEINE 50; 325; 40 MG/1; MG/1; MG/1
1 TABLET ORAL EVERY 4 HOURS PRN
Qty: 30 TABLET | Refills: 1 | Status: SHIPPED | OUTPATIENT
Start: 2019-03-19 | End: 2020-03-16

## 2019-03-19 NOTE — TELEPHONE ENCOUNTER
----- Message from Rosalind Carlos sent at 3/19/2019  1:03 PM CDT -----  Contact: Salbador with [employer] phone [phone]  Salbador with [employer] phone [phone], Calling to inform you of a drug interaction for Rx butalbital-acetaminophen-caffeine -40 mg (FIORICET, ESGIC) -40 mg per tablet and Rx zolpidem (AMBIEN) 10 mg Tab and Rx Klonopin. Please call him. Thanks.

## 2019-03-19 NOTE — PROGRESS NOTES
"Subjective:       Patient ID: Marilu London is a 33 y.o. female.    Chief Complaint: Migraine (Patient says she has had 3 migraines this month and OTC meds are not helping )    Long hx of migraines for past 10-15 years. Typically gets about one per month, but have been more intense lately, and had 3 in the past month. Typically OTC Aleve helps, but not helping any more. Has been under increasing stress at work, more responsibilities. Has nausea with migraines, no vomiting. Photophobia, no phonophobia. No identified triggers. HA usually lasts for a few hours. Has neurologist, but has never been on prescription meds, and hasn't seen in a while.      Review of Systems   Constitutional: Negative for fever and unexpected weight change.   HENT: Negative for trouble swallowing.    Eyes: Positive for photophobia.   Respiratory: Negative for shortness of breath.    Cardiovascular: Negative for chest pain.   Gastrointestinal: Positive for nausea.   Genitourinary: Negative for difficulty urinating.   Skin: Negative for rash.   Allergic/Immunologic: Negative for immunocompromised state.   Neurological: Positive for headaches. Negative for dizziness and facial asymmetry.   Hematological: Does not bruise/bleed easily.   Psychiatric/Behavioral: Positive for sleep disturbance. Negative for agitation and confusion.       Objective:      Vitals:    03/19/19 1233   BP: 109/63   BP Location: Left arm   Patient Position: Sitting   BP Method: Medium (Automatic)   Pulse: 72   Resp: 16   Temp: 98.5 °F (36.9 °C)   TempSrc: Oral   SpO2: 99%   Weight: 62.1 kg (137 lb)   Height: 5' 2" (1.575 m)     Physical Exam   Constitutional: She is oriented to person, place, and time. She appears well-developed and well-nourished.   HENT:   Head: Normocephalic and atraumatic.   Right Ear: External ear normal.   Left Ear: External ear normal.   Mouth/Throat: Oropharynx is clear and moist.   Eyes: EOM are normal. Pupils are equal, round, and reactive to " light.   Neck: Neck supple. No JVD present. Carotid bruit is not present.   Cardiovascular: Normal rate, regular rhythm and normal heart sounds.   Pulmonary/Chest: Effort normal and breath sounds normal.   Musculoskeletal: She exhibits no edema.   Neurological: She is alert and oriented to person, place, and time.   Skin: Skin is warm and dry.   Psychiatric: She has a normal mood and affect. Her behavior is normal.   Nursing note and vitals reviewed.      Assessment:       1. Migraine without aura and without status migrainosus, not intractable        Plan:       Migraine without aura and without status migrainosus, not intractable  -     butalbital-acetaminophen-caffeine -40 mg (FIORICET, ESGIC) -40 mg per tablet; Take 1 tablet by mouth every 4 (four) hours as needed for Headaches.  Dispense: 30 tablet; Refill: 1  Advised to use Fioricet sparingly. F/u with neurologist if symptoms persist or worsen    Medication List with Changes/Refills   New Medications    BUTALBITAL-ACETAMINOPHEN-CAFFEINE -40 MG (FIORICET, ESGIC) -40 MG PER TABLET    Take 1 tablet by mouth every 4 (four) hours as needed for Headaches.   Current Medications    DESVENLAFAXINE SUCCINATE (PRISTIQ) 50 MG TB24    Take 100 mg by mouth once daily.    DEXTROAMPHETAMINE-AMPHETAMINE 10 MG TAB    Take 10 mg by mouth once daily.    ZOLPIDEM (AMBIEN) 10 MG TAB       Discontinued Medications    CLONAZEPAM (KLONOPIN) 0.5 MG TABLET    Take 0.5 mg by mouth 2 (two) times daily as needed for Anxiety.    DEXTROAMPHETAMINE-AMPHETAMINE (ADDERALL) 20 MG TABLET    Take 1 tablet by mouth once daily.     FLUOXETINE (PROZAC) 20 MG CAPSULE

## 2019-12-05 NOTE — TELEPHONE ENCOUNTER
----- Message from [place] GUEVARA Frisard sent at 5/23/2017 10:01 AM CDT -----  Contact: same  Patient called in just now and stated she had a 9:40am appt this morning Tuesday 5/24/17 and just could not leave work and would like to reschedule.  Patient would like to see if Dr. Coles would have anything the first week of June.  Call back number is [phone]  
Appointment rescheduled to 9-26-17, mailed, and she indicated understanding.  
Vital Signs Last 24 Hrs  T(C): 38.3 (04 Dec 2019 22:45), Max: 38.8 (04 Dec 2019 20:58)  T(F): 100.9 (04 Dec 2019 22:45), Max: 101.9 (04 Dec 2019 20:58)  HR: 98 (05 Dec 2019 00:13) (98 - 128)  BP: 126/67 (05 Dec 2019 00:13) (116/68 - 170/85)  BP(mean): 82 (05 Dec 2019 00:13) (82 - 83)  RR: 24 (04 Dec 2019 22:45) (16 - 24)  SpO2: 99% (05 Dec 2019 00:13) (96% - 99%)

## 2020-03-16 DIAGNOSIS — G43.009 MIGRAINE WITHOUT AURA AND WITHOUT STATUS MIGRAINOSUS, NOT INTRACTABLE: ICD-10-CM

## 2020-03-16 RX ORDER — BUTALBITAL, ACETAMINOPHEN AND CAFFEINE 50; 325; 40 MG/1; MG/1; MG/1
TABLET ORAL
Qty: 30 TABLET | Refills: 0 | Status: SHIPPED | OUTPATIENT
Start: 2020-03-16 | End: 2021-09-30

## 2020-07-09 ENCOUNTER — OFFICE VISIT (OUTPATIENT)
Dept: PRIMARY CARE CLINIC | Facility: CLINIC | Age: 35
End: 2020-07-09
Payer: COMMERCIAL

## 2020-07-09 VITALS
OXYGEN SATURATION: 99 % | SYSTOLIC BLOOD PRESSURE: 120 MMHG | RESPIRATION RATE: 18 BRPM | WEIGHT: 143.19 LBS | TEMPERATURE: 99 F | HEIGHT: 62 IN | BODY MASS INDEX: 26.35 KG/M2 | HEART RATE: 91 BPM | DIASTOLIC BLOOD PRESSURE: 64 MMHG

## 2020-07-09 DIAGNOSIS — M54.41 ACUTE RIGHT-SIDED LOW BACK PAIN WITH RIGHT-SIDED SCIATICA: Primary | ICD-10-CM

## 2020-07-09 PROCEDURE — 99213 PR OFFICE/OUTPT VISIT, EST, LEVL III, 20-29 MIN: ICD-10-PCS | Mod: S$GLB,,, | Performed by: INTERNAL MEDICINE

## 2020-07-09 PROCEDURE — 99999 PR PBB SHADOW E&M-EST. PATIENT-LVL IV: CPT | Mod: PBBFAC,,, | Performed by: INTERNAL MEDICINE

## 2020-07-09 PROCEDURE — 99999 PR PBB SHADOW E&M-EST. PATIENT-LVL IV: ICD-10-PCS | Mod: PBBFAC,,, | Performed by: INTERNAL MEDICINE

## 2020-07-09 PROCEDURE — 99213 OFFICE O/P EST LOW 20 MIN: CPT | Mod: S$GLB,,, | Performed by: INTERNAL MEDICINE

## 2020-07-09 RX ORDER — TRAMADOL HYDROCHLORIDE 50 MG/1
50 TABLET ORAL EVERY 8 HOURS PRN
Qty: 20 TABLET | Refills: 0 | Status: SHIPPED | OUTPATIENT
Start: 2020-07-09 | End: 2021-09-30

## 2020-07-09 RX ORDER — CLONAZEPAM 0.5 MG/1
1 TABLET ORAL DAILY PRN
COMMUNITY
Start: 2020-06-22

## 2020-07-09 RX ORDER — TIZANIDINE 4 MG/1
4 TABLET ORAL 2 TIMES DAILY PRN
Qty: 30 TABLET | Refills: 0 | Status: SHIPPED | OUTPATIENT
Start: 2020-07-09 | End: 2020-08-04

## 2020-07-09 RX ORDER — GABAPENTIN 300 MG/1
300 CAPSULE ORAL 2 TIMES DAILY
Qty: 60 CAPSULE | Refills: 1 | Status: SHIPPED | OUTPATIENT
Start: 2020-07-09 | End: 2021-09-30

## 2020-07-09 NOTE — PROGRESS NOTES
Subjective:      Patient ID: Marilu London is a 35 y.o. female.    Chief Complaint: Back Pain (lower back pain that radiates down right leg x 5 days)    HPI  patient visit today complained of lower back pain and shooting pain going down her right leg that has been going on for 5 days she states that see slept with the shoulder and workup with back pain and shooting pain burning pain going down her right leg she tries to work it out but the pain persists she deny any weakness in her leg deny any history of back problem in the past deny any skin rash an she currently not pregnant  Review of Systems    Objective:      Physical Exam  Vitals signs and nursing note reviewed.   Constitutional:       General: She is not in acute distress.     Appearance: She is well-developed.   HENT:      Head: Normocephalic and atraumatic.      Right Ear: Tympanic membrane, ear canal and external ear normal.      Left Ear: Tympanic membrane, ear canal and external ear normal.      Nose: Nose normal.      Mouth/Throat:      Pharynx: No oropharyngeal exudate.   Eyes:      General:         Right eye: No discharge.         Left eye: No discharge.      Conjunctiva/sclera: Conjunctivae normal.      Pupils: Pupils are equal, round, and reactive to light.   Neck:      Musculoskeletal: Normal range of motion and neck supple.      Thyroid: No thyromegaly.   Cardiovascular:      Rate and Rhythm: Normal rate and regular rhythm.      Heart sounds: Normal heart sounds. No murmur. No friction rub. No gallop.    Pulmonary:      Effort: Pulmonary effort is normal. No respiratory distress.      Breath sounds: Normal breath sounds. No wheezing or rales.   Abdominal:      General: Bowel sounds are normal. There is no distension.      Palpations: Abdomen is soft.      Tenderness: There is no abdominal tenderness.   Musculoskeletal: Normal range of motion.         General: Tenderness (Subjective tenderness in the right lower back radiating down into the  right leg posteriorly and positive leg raising on the right) present. No deformity.   Lymphadenopathy:      Cervical: No cervical adenopathy.   Skin:     General: Skin is warm and dry.      Findings: No erythema or rash.   Neurological:      Mental Status: She is alert and oriented to person, place, and time.   Psychiatric:         Mood and Affect: Mood normal.         Thought Content: Thought content normal.         Judgment: Judgment normal.         Assessment:       1. Acute right-sided low back pain with right-sided sciatica    2. Dorsalgia, unspecified        Plan:       Acute right-sided low back pain with right-sided sciatica  -     X-Ray Lumbar Spine Ap And Lateral; Future; Expected date: 07/09/2020  -     tiZANidine (ZANAFLEX) 4 MG tablet; Take 1 tablet (4 mg total) by mouth 2 (two) times daily as needed.  Dispense: 30 tablet; Refill: 0  -     gabapentin (NEURONTIN) 300 MG capsule; Take 1 capsule (300 mg total) by mouth 2 (two) times daily. For neuropathy  Dispense: 60 capsule; Refill: 1  -     traMADoL (ULTRAM) 50 mg tablet; Take 1 tablet (50 mg total) by mouth every 8 (eight) hours as needed for Pain.  Dispense: 20 tablet; Refill: 0    Dorsalgia, unspecified    Other orders  -     Cancel: Intermediate Joint Aspiration/Injection

## 2020-07-13 ENCOUNTER — TELEPHONE (OUTPATIENT)
Dept: PRIMARY CARE CLINIC | Facility: CLINIC | Age: 35
End: 2020-07-13

## 2020-07-13 ENCOUNTER — TELEPHONE (OUTPATIENT)
Dept: ORTHOPEDICS | Facility: CLINIC | Age: 35
End: 2020-07-13

## 2020-07-13 DIAGNOSIS — M54.41 CHRONIC MIDLINE LOW BACK PAIN WITH BILATERAL SCIATICA: Primary | ICD-10-CM

## 2020-07-13 DIAGNOSIS — M51.36 DDD (DEGENERATIVE DISC DISEASE), LUMBAR: Primary | ICD-10-CM

## 2020-07-13 DIAGNOSIS — G89.29 CHRONIC MIDLINE LOW BACK PAIN WITH BILATERAL SCIATICA: Primary | ICD-10-CM

## 2020-07-13 DIAGNOSIS — M54.42 CHRONIC MIDLINE LOW BACK PAIN WITH BILATERAL SCIATICA: Primary | ICD-10-CM

## 2020-07-13 NOTE — TELEPHONE ENCOUNTER
----- Message from Rosalind Carlos sent at 7/13/2020 10:42 AM CDT -----  Contact: Patient  Type:  Test Results    Who Called:  Marilu patient  Name of Test (Lab/Mammo/Etc):  Xray  Date of Test:  07/09/2020  Ordering Provider:  Dr Alvarez  Where the test was performed:  [facility]  Best Call Back Number:  [phone]  Additional Information:  Please call her. Thanks.

## 2020-07-13 NOTE — TELEPHONE ENCOUNTER
Patient was scheduled incorrectly, dr. Lombardo does not see patients for back neck or spine problems

## 2020-07-13 NOTE — TELEPHONE ENCOUNTER
Spoke with pt. Reviewed results of X-ray Lumbar spine ordered by Dr. Alvarez. Pt. Would like to consult with orthopedic physician regarding her back pain. Please sign pended ref. To Dr. Ríos

## 2020-07-14 ENCOUNTER — TELEPHONE (OUTPATIENT)
Dept: PRIMARY CARE CLINIC | Facility: CLINIC | Age: 35
End: 2020-07-14

## 2020-07-14 NOTE — TELEPHONE ENCOUNTER
Please see message below pt. Had DDD and would like to see someone for her lower back pain. Dr. Ríos does not see patients for neck, spine or back. Please ref. Patient to another specialist

## 2020-07-14 NOTE — TELEPHONE ENCOUNTER
----- Message from Paula Feldman sent at 7/14/2020  8:48 AM CDT -----  Dr Lombardo will not see patients for back/spine problems [facility] has a back/spine clinic referral needs to be changed . Thanks

## 2020-07-14 NOTE — TELEPHONE ENCOUNTER
Pt ref. To Dr. Maninder Agosto - gave pt. The information to call their office to set up appointment for DDD.

## 2020-07-17 ENCOUNTER — TELEPHONE (OUTPATIENT)
Dept: PRIMARY CARE CLINIC | Facility: CLINIC | Age: 35
End: 2020-07-17

## 2020-07-17 NOTE — TELEPHONE ENCOUNTER
DR Lombardo do not take medicaids and treat spine please refer pt to neurosurgery at [facility]

## 2020-07-17 NOTE — TELEPHONE ENCOUNTER
The pt. Has already been re-referred to Dr. Agosto does she need another ref. To [facility] neurosurgery?

## 2020-07-30 NOTE — TELEPHONE ENCOUNTER
Called pt. No answer left VM regarding apt. Needed to see the Neurosurgery either Dr. Agosto or [facility] neuro dept.

## 2020-12-09 ENCOUNTER — CLINICAL SUPPORT (OUTPATIENT)
Dept: OTHER | Facility: CLINIC | Age: 35
End: 2020-12-09
Payer: COMMERCIAL

## 2020-12-09 DIAGNOSIS — Z00.8 ENCOUNTER FOR OTHER GENERAL EXAMINATION: ICD-10-CM

## 2020-12-10 VITALS — HEIGHT: 62 IN | BODY MASS INDEX: 26.19 KG/M2

## 2020-12-10 LAB
GLUCOSE SERPL-MCNC: 99 MG/DL (ref 60–140)
HDLC SERPL-MCNC: 82 MG/DL
POC CHOLESTEROL, LDL (DOCK): 33 MG/DL
POC CHOLESTEROL, TOTAL: 145 MG/DL
TRIGL SERPL-MCNC: 152 MG/DL

## 2021-09-30 ENCOUNTER — OFFICE VISIT (OUTPATIENT)
Dept: PRIMARY CARE CLINIC | Facility: CLINIC | Age: 36
End: 2021-09-30
Payer: COMMERCIAL

## 2021-09-30 VITALS
OXYGEN SATURATION: 98 % | WEIGHT: 148.81 LBS | HEART RATE: 90 BPM | RESPIRATION RATE: 18 BRPM | BODY MASS INDEX: 27.38 KG/M2 | HEIGHT: 62 IN | DIASTOLIC BLOOD PRESSURE: 64 MMHG | SYSTOLIC BLOOD PRESSURE: 128 MMHG

## 2021-09-30 DIAGNOSIS — J40 BRONCHITIS: Primary | ICD-10-CM

## 2021-09-30 PROCEDURE — 99213 PR OFFICE/OUTPT VISIT, EST, LEVL III, 20-29 MIN: ICD-10-PCS | Mod: S$GLB,,, | Performed by: INTERNAL MEDICINE

## 2021-09-30 PROCEDURE — 99213 OFFICE O/P EST LOW 20 MIN: CPT | Mod: S$GLB,,, | Performed by: INTERNAL MEDICINE

## 2021-09-30 PROCEDURE — 99999 PR PBB SHADOW E&M-EST. PATIENT-LVL IV: CPT | Mod: PBBFAC,,, | Performed by: INTERNAL MEDICINE

## 2021-09-30 PROCEDURE — 99999 PR PBB SHADOW E&M-EST. PATIENT-LVL IV: ICD-10-PCS | Mod: PBBFAC,,, | Performed by: INTERNAL MEDICINE

## 2021-09-30 RX ORDER — LEVOFLOXACIN 500 MG/1
500 TABLET, FILM COATED ORAL DAILY
Qty: 10 TABLET | Refills: 0 | Status: SHIPPED | OUTPATIENT
Start: 2021-09-30 | End: 2021-10-10

## 2021-09-30 RX ORDER — ALBUTEROL SULFATE 90 UG/1
2 AEROSOL, METERED RESPIRATORY (INHALATION) EVERY 4 HOURS PRN
Qty: 8 G | Refills: 1 | Status: SHIPPED | OUTPATIENT
Start: 2021-09-30 | End: 2021-10-07

## 2021-09-30 RX ORDER — CODEINE PHOSPHATE AND GUAIFENESIN 10; 100 MG/5ML; MG/5ML
5 SOLUTION ORAL EVERY 6 HOURS PRN
Qty: 120 ML | Refills: 0 | Status: SHIPPED | OUTPATIENT
Start: 2021-09-30 | End: 2023-03-20

## 2021-09-30 RX ORDER — METHYLPREDNISOLONE 4 MG/1
TABLET ORAL
Qty: 1 PACKAGE | Refills: 0 | Status: SHIPPED | OUTPATIENT
Start: 2021-09-30 | End: 2023-03-20

## 2021-10-07 ENCOUNTER — OFFICE VISIT (OUTPATIENT)
Dept: OBSTETRICS AND GYNECOLOGY | Facility: CLINIC | Age: 36
End: 2021-10-07
Payer: COMMERCIAL

## 2021-10-07 VITALS
DIASTOLIC BLOOD PRESSURE: 70 MMHG | BODY MASS INDEX: 28.31 KG/M2 | SYSTOLIC BLOOD PRESSURE: 110 MMHG | WEIGHT: 144.19 LBS | HEIGHT: 60 IN

## 2021-10-07 DIAGNOSIS — R53.83 OTHER FATIGUE: ICD-10-CM

## 2021-10-07 DIAGNOSIS — Z11.3 SCREENING EXAMINATION FOR STD (SEXUALLY TRANSMITTED DISEASE): ICD-10-CM

## 2021-10-07 DIAGNOSIS — Z01.419 WELL WOMAN EXAM: Primary | ICD-10-CM

## 2021-10-07 PROCEDURE — 99999 PR PBB SHADOW E&M-EST. PATIENT-LVL III: CPT | Mod: PBBFAC,,, | Performed by: OBSTETRICS & GYNECOLOGY

## 2021-10-07 PROCEDURE — 99385 PR PREVENTIVE VISIT,NEW,18-39: ICD-10-PCS | Mod: S$GLB,,, | Performed by: OBSTETRICS & GYNECOLOGY

## 2021-10-07 PROCEDURE — 87491 CHLMYD TRACH DNA AMP PROBE: CPT | Performed by: OBSTETRICS & GYNECOLOGY

## 2021-10-07 PROCEDURE — 87624 HPV HI-RISK TYP POOLED RSLT: CPT | Performed by: OBSTETRICS & GYNECOLOGY

## 2021-10-07 PROCEDURE — 99999 PR PBB SHADOW E&M-EST. PATIENT-LVL III: ICD-10-PCS | Mod: PBBFAC,,, | Performed by: OBSTETRICS & GYNECOLOGY

## 2021-10-07 PROCEDURE — 99385 PREV VISIT NEW AGE 18-39: CPT | Mod: S$GLB,,, | Performed by: OBSTETRICS & GYNECOLOGY

## 2021-10-07 PROCEDURE — 87591 N.GONORRHOEAE DNA AMP PROB: CPT | Performed by: OBSTETRICS & GYNECOLOGY

## 2021-10-07 PROCEDURE — 88142 CYTOPATH C/V THIN LAYER: CPT | Performed by: OBSTETRICS & GYNECOLOGY

## 2021-10-10 LAB
C TRACH DNA SPEC QL NAA+PROBE: NOT DETECTED
N GONORRHOEA DNA SPEC QL NAA+PROBE: NOT DETECTED

## 2021-10-14 LAB
FINAL PATHOLOGIC DIAGNOSIS: NORMAL
Lab: NORMAL

## 2021-10-20 LAB
HPV HR 12 DNA SPEC QL NAA+PROBE: NEGATIVE
HPV16 AG SPEC QL: NEGATIVE
HPV18 DNA SPEC QL NAA+PROBE: NEGATIVE

## 2021-12-03 ENCOUNTER — TELEPHONE (OUTPATIENT)
Dept: PRIMARY CARE CLINIC | Facility: CLINIC | Age: 36
End: 2021-12-03
Payer: COMMERCIAL

## 2021-12-14 ENCOUNTER — CLINICAL SUPPORT (OUTPATIENT)
Dept: OTHER | Facility: CLINIC | Age: 36
End: 2021-12-14
Payer: COMMERCIAL

## 2021-12-14 DIAGNOSIS — Z00.8 ENCOUNTER FOR OTHER GENERAL EXAMINATION: ICD-10-CM

## 2021-12-15 VITALS — BODY MASS INDEX: 26.37 KG/M2 | HEIGHT: 62 IN

## 2021-12-15 LAB
HDLC SERPL-MCNC: 71 MG/DL
POC CHOLESTEROL, TOTAL: 140 MG/DL
POC GLUCOSE, FASTING: 93 MG/DL (ref 60–110)
TRIGL SERPL-MCNC: 44 MG/DL

## 2022-09-13 ENCOUNTER — TELEPHONE (OUTPATIENT)
Dept: PRIMARY CARE CLINIC | Facility: CLINIC | Age: 37
End: 2022-09-13
Payer: COMMERCIAL

## 2022-09-13 NOTE — TELEPHONE ENCOUNTER
----- Message from Monique Castro sent at 9/13/2022 11:49 AM CDT -----  Contact: [phone]  [email or web address]     Patient is calling for Medical Advice regarding:fever blister    How long has patient had these symptoms:1-2 days    Pharmacy name and phone#:      [id number] - [place] - [address]  [address]  [place] [address]  Phone: [phone] Fax: [phone]      Would like response via mychart:  phone    Comments:

## 2022-09-27 ENCOUNTER — PATIENT MESSAGE (OUTPATIENT)
Dept: PRIMARY CARE CLINIC | Facility: CLINIC | Age: 37
End: 2022-09-27
Payer: COMMERCIAL

## 2023-01-10 ENCOUNTER — CLINICAL SUPPORT (OUTPATIENT)
Dept: OTHER | Facility: CLINIC | Age: 38
End: 2023-01-10
Payer: COMMERCIAL

## 2023-01-10 DIAGNOSIS — Z00.8 ENCOUNTER FOR OTHER GENERAL EXAMINATION: ICD-10-CM

## 2023-01-11 VITALS
HEIGHT: 62 IN | WEIGHT: 174 LBS | BODY MASS INDEX: 32.02 KG/M2 | DIASTOLIC BLOOD PRESSURE: 74 MMHG | SYSTOLIC BLOOD PRESSURE: 122 MMHG

## 2023-01-11 LAB
HDLC SERPL-MCNC: 101 MG/DL
POC CHOLESTEROL, TOTAL: 169 MG/DL
POC GLUCOSE, FASTING: 102 MG/DL (ref 60–110)
TRIGL SERPL-MCNC: 44 MG/DL

## 2023-03-14 PROBLEM — M25.531 RIGHT WRIST PAIN: Status: ACTIVE | Noted: 2023-03-14

## 2023-03-20 ENCOUNTER — OFFICE VISIT (OUTPATIENT)
Dept: ORTHOPEDICS | Facility: CLINIC | Age: 38
End: 2023-03-20
Payer: COMMERCIAL

## 2023-03-20 VITALS — WEIGHT: 174 LBS | BODY MASS INDEX: 32.02 KG/M2 | HEIGHT: 62 IN

## 2023-03-20 DIAGNOSIS — M65.4 DE QUERVAIN'S TENOSYNOVITIS, RIGHT: Primary | ICD-10-CM

## 2023-03-20 PROCEDURE — 99203 PR OFFICE/OUTPT VISIT, NEW, LEVL III, 30-44 MIN: ICD-10-PCS | Mod: 25,S$GLB,, | Performed by: ORTHOPAEDIC SURGERY

## 2023-03-20 PROCEDURE — 20550 TENDON SHEATH: ICD-10-PCS | Mod: RT,S$GLB,, | Performed by: ORTHOPAEDIC SURGERY

## 2023-03-20 PROCEDURE — 1159F MED LIST DOCD IN RCRD: CPT | Mod: CPTII,S$GLB,, | Performed by: ORTHOPAEDIC SURGERY

## 2023-03-20 PROCEDURE — 3008F BODY MASS INDEX DOCD: CPT | Mod: CPTII,S$GLB,, | Performed by: ORTHOPAEDIC SURGERY

## 2023-03-20 PROCEDURE — 3008F PR BODY MASS INDEX (BMI) DOCUMENTED: ICD-10-PCS | Mod: CPTII,S$GLB,, | Performed by: ORTHOPAEDIC SURGERY

## 2023-03-20 PROCEDURE — 99203 OFFICE O/P NEW LOW 30 MIN: CPT | Mod: 25,S$GLB,, | Performed by: ORTHOPAEDIC SURGERY

## 2023-03-20 PROCEDURE — 1159F PR MEDICATION LIST DOCUMENTED IN MEDICAL RECORD: ICD-10-PCS | Mod: CPTII,S$GLB,, | Performed by: ORTHOPAEDIC SURGERY

## 2023-03-20 PROCEDURE — 99999 PR PBB SHADOW E&M-EST. PATIENT-LVL III: ICD-10-PCS | Mod: PBBFAC,,, | Performed by: ORTHOPAEDIC SURGERY

## 2023-03-20 PROCEDURE — 99999 PR PBB SHADOW E&M-EST. PATIENT-LVL III: CPT | Mod: PBBFAC,,, | Performed by: ORTHOPAEDIC SURGERY

## 2023-03-20 PROCEDURE — 20550 NJX 1 TENDON SHEATH/LIGAMENT: CPT | Mod: RT,S$GLB,, | Performed by: ORTHOPAEDIC SURGERY

## 2023-03-20 RX ADMIN — TRIAMCINOLONE ACETONIDE 40 MG: 40 INJECTION, SUSPENSION INTRA-ARTICULAR; INTRAMUSCULAR at 08:03

## 2023-03-20 NOTE — PROGRESS NOTES
3/20/2023    Chief Complaint:  Chief Complaint   Patient presents with    Right Wrist - Pain, Injury       HPI:  Marilu London is a 37 y.o. female, who presents to clinic today she has a history of pain over the right wrist.  She states that it started after a 16 hour chan session.  She states that she then had a flexion type injury to her wrist which cause increased pain.  She has tried wearing a splint without relief.  She has no other complaints    PMHX:  Past Medical History:   Diagnosis Date    Dizziness     Endometriosis of uterus     Gastritis     Headache     Mental disorder     Numbness and tingling in both hands        PSHX:  Past Surgical History:   Procedure Laterality Date    HYSTERECTOMY         FMHX:  Family History   Problem Relation Age of Onset    Diabetes Maternal Grandmother        SOCHX:  Social History     Tobacco Use    Smoking status: Former    Smokeless tobacco: Never   Substance Use Topics    Alcohol use: Yes       ALLERGIES:  Patient has no known allergies.    CURRENT MEDICATIONS:  Current Outpatient Medications on File Prior to Visit   Medication Sig Dispense Refill    clonazePAM (KLONOPIN) 0.5 MG tablet Take 1 tablet by mouth daily as needed.      desvenlafaxine succinate (PRISTIQ) 50 MG Tb24 Take 100 mg by mouth once daily.      dextroamphetamine-amphetamine 10 mg Tab Take 10 mg by mouth once daily.      ibuprofen (ADVIL,MOTRIN) 800 MG tablet Take 1 tablet (800 mg total) by mouth every 6 (six) hours as needed for Pain. 20 tablet 0    zolpidem (AMBIEN) 10 mg Tab Take 10 mg by mouth nightly as needed.       [DISCONTINUED] guaiFENesin-codeine 100-10 mg/5 ml (TUSSI-ORGANIDIN NR)  mg/5 mL syrup Take 5 mLs by mouth every 6 (six) hours as needed for Cough. (Patient not taking: Reported on 10/7/2021) 120 mL 0    [DISCONTINUED] methylPREDNISolone (MEDROL DOSEPACK) 4 mg tablet use as directed (Patient not taking: Reported on 10/7/2021) 1 Package 0     No current facility-administered  "medications on file prior to visit.       REVIEW OF SYSTEMS:  Review of Systems   Constitutional:  Negative for diaphoresis and fever.   HENT:  Positive for tinnitus. Negative for ear pain, hearing loss and nosebleeds.    Eyes:  Negative for pain and redness.   Respiratory:  Negative for cough and shortness of breath.    Cardiovascular:  Positive for palpitations. Negative for chest pain.   Gastrointestinal:  Negative for blood in stool, constipation, diarrhea, nausea and vomiting.   Genitourinary:  Negative for dysuria, frequency and hematuria.   Musculoskeletal:  Positive for myalgias. Negative for back pain.   Skin:  Negative for itching and rash.   Neurological:  Positive for dizziness, tingling, weakness and headaches. Negative for seizures.   Endo/Heme/Allergies:  Positive for environmental allergies.   Psychiatric/Behavioral:  The patient is not nervous/anxious.      GENERAL PHYSICAL EXAM:   Ht 5' 2" (1.575 m)   Wt 78.9 kg (174 lb)   LMP  (LMP Unknown)   BMI 31.83 kg/m²    GEN: well developed, well nourished, no acute distress   HENT: Normocephalic, atraumatic   EYES: No discharge, conjunctiva normal   NECK: Supple, non-tender   PULM: No wheezing, no respiratory distress   CV: RRR   ABD: Soft, non-tender    ORTHO EXAM:   Examination of the right wrist reveals that there are no major skin changes.  There is very mild edema of the 1st extensor compartment at the radial styloid.  Palpation about the wrist produces significant tenderness over the 1st extensor compartment.  She has a markedly positive Finkelstein's test.  She does not have tenderness over the ulnar side of the wrist.  She is grossly neurovascularly intact but she does report mild decreased sensation throughout certain portions of her hand due to MS.    RADIOLOGY:   None    ASSESSMENT:   Right wrist de Quervain tenosynovitis    PLAN:  1. After informed consent was obtained and injection was placed to the right wrist 1st extensor compartment.  " The patient tolerated that well.      2.  Follow up with me on a p.r.n. basis Answers submitted by the patient for this visit:  Orthopedics Questionnaire (Submitted on 3/17/2023)  unexpected weight change: No  appetite change : No  sleep disturbance: Yes  IMMUNOCOMPROMISED: No  dysphoric mood: No  visual disturbance: No  sinus pressure : Yes  food allergies: No  difficulty urinating: No  painful intercourse: No  numbness: Yes  joint swelling: No   (Submitted on 3/17/2023)  Chief Complaint: Hand injury  Pain Chronicity: new  History of trauma: No  Onset: in the past 7 days  Frequency: constantly  Progression since onset: waxing and waning  Injury mechanism: pushing  injury location: at home  pain- numeric: 5/10  pain location: right wrist, right hand, other  pain quality: sharp, burning, numbing, throbbing  Radiating Pain: Yes  If your pain is radiating, to what part of the body?: right arm, right forearm  Aggravating factors: bending, bearing weight, contact, extension, twisting, rotation  inability to bear weight: Yes  joint locking: Yes  limited range of motion: Yes  stiffness: Yes  Treatments tried: OTC pain meds, rest  physical therapy: not tried  Improvement on treatment: mild

## 2023-03-27 RX ORDER — TRIAMCINOLONE ACETONIDE 40 MG/ML
40 INJECTION, SUSPENSION INTRA-ARTICULAR; INTRAMUSCULAR
Status: DISCONTINUED | OUTPATIENT
Start: 2023-03-20 | End: 2023-03-27 | Stop reason: HOSPADM

## 2023-03-27 NOTE — PROCEDURES
Tendon Sheath    Date/Time: 3/20/2023 8:20 AM  Performed by: Steven Peace MD  Authorized by: Steven Peace MD     Consent Done?:  Yes (Verbal)  Indications:  Pain  Site marked: the procedure site was marked    Timeout: prior to procedure the correct patient, procedure, and site was verified    Prep: patient was prepped and draped in usual sterile fashion      Local anesthesia used?: Yes    Local anesthetic:  Lidocaine 1% without epinephrine  Anesthetic total (ml):  0.5    Location:  Wrist  Site:  R first doral compartment  Medications:  40 mg triamcinolone acetonide 40 mg/mL  Patient tolerance:  Patient tolerated the procedure well with no immediate complications

## 2023-08-07 ENCOUNTER — OFFICE VISIT (OUTPATIENT)
Dept: PRIMARY CARE CLINIC | Facility: CLINIC | Age: 38
End: 2023-08-07
Payer: COMMERCIAL

## 2023-08-07 VITALS
TEMPERATURE: 97 F | RESPIRATION RATE: 18 BRPM | HEART RATE: 87 BPM | HEIGHT: 62 IN | OXYGEN SATURATION: 99 % | WEIGHT: 182.13 LBS | BODY MASS INDEX: 33.51 KG/M2 | DIASTOLIC BLOOD PRESSURE: 80 MMHG | SYSTOLIC BLOOD PRESSURE: 126 MMHG

## 2023-08-07 DIAGNOSIS — F41.9 ANXIETY: ICD-10-CM

## 2023-08-07 DIAGNOSIS — R09.89 BRUIT OF RIGHT CAROTID ARTERY: ICD-10-CM

## 2023-08-07 DIAGNOSIS — G43.009 MIGRAINE WITHOUT AURA AND WITHOUT STATUS MIGRAINOSUS, NOT INTRACTABLE: ICD-10-CM

## 2023-08-07 DIAGNOSIS — Z13.1 SCREENING FOR DIABETES MELLITUS: Primary | ICD-10-CM

## 2023-08-07 DIAGNOSIS — Z11.4 ENCOUNTER FOR SCREENING FOR HIV: ICD-10-CM

## 2023-08-07 DIAGNOSIS — R42 VERTIGO: ICD-10-CM

## 2023-08-07 DIAGNOSIS — Z11.59 NEED FOR HEPATITIS C SCREENING TEST: ICD-10-CM

## 2023-08-07 DIAGNOSIS — F32.9 REACTIVE DEPRESSION: ICD-10-CM

## 2023-08-07 DIAGNOSIS — R93.89 ABNORMAL MRI: ICD-10-CM

## 2023-08-07 DIAGNOSIS — F98.8 ATTENTION DEFICIT DISORDER (ADD) WITHOUT HYPERACTIVITY: ICD-10-CM

## 2023-08-07 PROCEDURE — 1159F MED LIST DOCD IN RCRD: CPT | Mod: CPTII,S$GLB,, | Performed by: FAMILY MEDICINE

## 2023-08-07 PROCEDURE — 3079F DIAST BP 80-89 MM HG: CPT | Mod: CPTII,S$GLB,, | Performed by: FAMILY MEDICINE

## 2023-08-07 PROCEDURE — 3079F PR MOST RECENT DIASTOLIC BLOOD PRESSURE 80-89 MM HG: ICD-10-PCS | Mod: CPTII,S$GLB,, | Performed by: FAMILY MEDICINE

## 2023-08-07 PROCEDURE — 3008F BODY MASS INDEX DOCD: CPT | Mod: CPTII,S$GLB,, | Performed by: FAMILY MEDICINE

## 2023-08-07 PROCEDURE — 3074F SYST BP LT 130 MM HG: CPT | Mod: CPTII,S$GLB,, | Performed by: FAMILY MEDICINE

## 2023-08-07 PROCEDURE — 3008F PR BODY MASS INDEX (BMI) DOCUMENTED: ICD-10-PCS | Mod: CPTII,S$GLB,, | Performed by: FAMILY MEDICINE

## 2023-08-07 PROCEDURE — 99999 PR PBB SHADOW E&M-EST. PATIENT-LVL IV: ICD-10-PCS | Mod: PBBFAC,,, | Performed by: FAMILY MEDICINE

## 2023-08-07 PROCEDURE — 99214 PR OFFICE/OUTPT VISIT, EST, LEVL IV, 30-39 MIN: ICD-10-PCS | Mod: S$GLB,,, | Performed by: FAMILY MEDICINE

## 2023-08-07 PROCEDURE — 1159F PR MEDICATION LIST DOCUMENTED IN MEDICAL RECORD: ICD-10-PCS | Mod: CPTII,S$GLB,, | Performed by: FAMILY MEDICINE

## 2023-08-07 PROCEDURE — 99214 OFFICE O/P EST MOD 30 MIN: CPT | Mod: S$GLB,,, | Performed by: FAMILY MEDICINE

## 2023-08-07 PROCEDURE — 3074F PR MOST RECENT SYSTOLIC BLOOD PRESSURE < 130 MM HG: ICD-10-PCS | Mod: CPTII,S$GLB,, | Performed by: FAMILY MEDICINE

## 2023-08-07 PROCEDURE — 99999 PR PBB SHADOW E&M-EST. PATIENT-LVL IV: CPT | Mod: PBBFAC,,, | Performed by: FAMILY MEDICINE

## 2023-08-07 NOTE — PROGRESS NOTES
Subjective:       Patient ID: Marilu London is a 38 y.o. female.    Chief Complaint: Follow-up, Labs Only, Dizziness (/), and Numbness (/)    HPI:  38-year-old female in for blood work and checkup--eating well--+BM--ambulating well  History of hysterectomy  History of anxiety on Klonopin p.r.n.--life stressful--single --one child 20 yo--job --stressful--family OK --in [place] --OK   Finaces stressful, no sex but OK   Headaches history of ice pick headaches in the past cluster type--had seen neurologist in the past--dizziness--has 7 lesions in her brain--??testing for MS--tested for collagen vascular disease especially lupus never told was not having MS for sure   Bad vertigo attack 1 month ago and stayed confused for 1 week. Last \MRI brain 5 yrs ago   Had ischemic stroke 6 yrs ago o or seizures   Mental disorder anxiety depression and ADD--sees Dr Carrasco--behav health     ROS:  Skin: no psoriasis, eczema, skin cancer  HEENT: +headache--ice pick HA form cluster HA has seen neurologist , +ocular pain, no  blurred vision, diplopia, epistaxis, hoarseness change in voice, thyroid trouble  Lung: No pneumonia, asthma, Tb, wheezing, SOB, no smoking  Heart: No chest pain, ankle edema, palpitations, MI, leandro murmur, hypertension, hyperlipidemia sinus arrhythmia no stent or bypass  Abdomen: No nausea, vomiting, diarrhea, constipation, ulcers, hepatitis, gallbladder disease, melena, hematochezia, hematemesis  : no UTI, renal disease, stones  GYN LMP 2006 hyst hx endometriosis   MS: no fractures, O/A, lupus, rheumatoid, gout  Neuro: No dizziness, LOC, seizures +vertigo +dizziness see history of present illness history ischemic CVA  No diabetes, no anemia, + anxiety, + depression has not taken antidepressant --stopped antidepressant once on adderall    Objective:   Physical Exam:  General: Well nourished, well developed, no acute distress  Skin: No lesions  HEENT: Eyes PERRLA, EOM intact, nose patent, throat non-erythematous  ears TM clear  NECK: Supple, no bruits, No JVD, no nodes  ??right carotid bruit   Lungs: Clear, no rales, rhonchi, wheezing  Heart: Regular rate and rhythm, no murmurs, gallops, or rubs  Abdomen: flat, bowel sounds positive, no tenderness, or organomegaly  MS: Range of motion and muscle strength intact good hand grasp good opposition thumb index thumb 5th digit good flexion extension forearms to opposition able squat arise without difficulty except for some cracking of the knee  Neuro: Alert, CN intact, oriented X 3 Romberg negative heel-toe intact  Extremities: No cyanosis, clubbing, or edema         Assessment:       1. Screening for diabetes mellitus    2. Migraine without aura and without status migrainosus, not intractable    3. Vertigo    4. Anxiety    5. Reactive depression    6. Attention deficit disorder (ADD) without hyperactivity    7. Abnormal MRI    8. Bruit of right carotid artery    9. Encounter for screening for HIV    10. Need for hepatitis C screening test        Plan:       Screening for diabetes mellitus  -     Hemoglobin A1C; Future; Expected date: 08/07/2023    Migraine without aura and without status migrainosus, not intractable  -     MRI Brain W WO Contrast; Future; Expected date: 08/07/2023    Vertigo  -     CBC Auto Differential; Future; Expected date: 08/07/2023  -     Comprehensive Metabolic Panel; Future; Expected date: 08/07/2023  -     Lipid Panel; Future; Expected date: 08/07/2023  -     Ambulatory referral/consult to Neurology; Future; Expected date: 08/14/2023  -     MRI Brain W WO Contrast; Future; Expected date: 08/07/2023    Anxiety  -     T4, Free; Future; Expected date: 08/07/2023  -     TSH; Future; Expected date: 08/07/2023  -     EKG 12-lead; Future    Reactive depression    Attention deficit disorder (ADD) without hyperactivity    Abnormal MRI    Bruit of right carotid artery  -     US Carotid Bilateral; Future; Expected date: 08/07/2023    Encounter for screening for  HIV  -     HIV 1/2 Ag/Ab (4th Gen); Future; Expected date: 08/07/2023    Need for hepatitis C screening test  -     Hepatitis C Antibody; Future; Expected date: 08/07/2023        Hx anxiety depression ADD--on Klonopin Adderall--sees behavioral health see DR Carrasco  History of headache-ice pick type-cluster type---recent dizziness vertigo with loss of memory x1 week after severe vertigo attack--history of abnormal MRI of the brain 2017--showing 5 mm and several 2-3 mm flare suggesting chronic deep white matter ischemic changes but could not rule out multiple sclerosis--patient with ocular pain/headache/dizziness vertigo/memory loss--see Neurology  ?  Right carotid bruit   Lab CBCs CMP lipids T4 TSH EKG  Return 3 months--Keep appointment with behavioral health Dr. Carrasco--see neurologist --get MRI brain  Health maintenance hepatitis C HIV hemoglobin A1c COVID tetanus vaccine

## 2023-09-07 ENCOUNTER — OFFICE VISIT (OUTPATIENT)
Dept: NEUROLOGY | Facility: CLINIC | Age: 38
End: 2023-09-07
Payer: COMMERCIAL

## 2023-09-07 VITALS
HEIGHT: 62 IN | SYSTOLIC BLOOD PRESSURE: 106 MMHG | HEART RATE: 73 BPM | WEIGHT: 182 LBS | DIASTOLIC BLOOD PRESSURE: 62 MMHG | BODY MASS INDEX: 33.49 KG/M2

## 2023-09-07 DIAGNOSIS — R42 VERTIGO: Primary | ICD-10-CM

## 2023-09-07 DIAGNOSIS — R51.9 NONINTRACTABLE HEADACHE, UNSPECIFIED CHRONICITY PATTERN, UNSPECIFIED HEADACHE TYPE: ICD-10-CM

## 2023-09-07 DIAGNOSIS — G43.809 OTHER MIGRAINE WITHOUT STATUS MIGRAINOSUS, NOT INTRACTABLE: ICD-10-CM

## 2023-09-07 PROBLEM — G43.909 MIGRAINE WITHOUT STATUS MIGRAINOSUS, NOT INTRACTABLE: Status: ACTIVE | Noted: 2023-09-07

## 2023-09-07 PROCEDURE — 99204 PR OFFICE/OUTPT VISIT, NEW, LEVL IV, 45-59 MIN: ICD-10-PCS | Mod: S$GLB,,, | Performed by: STUDENT IN AN ORGANIZED HEALTH CARE EDUCATION/TRAINING PROGRAM

## 2023-09-07 PROCEDURE — 99999 PR PBB SHADOW E&M-EST. PATIENT-LVL III: CPT | Mod: PBBFAC,,, | Performed by: STUDENT IN AN ORGANIZED HEALTH CARE EDUCATION/TRAINING PROGRAM

## 2023-09-07 PROCEDURE — 3008F PR BODY MASS INDEX (BMI) DOCUMENTED: ICD-10-PCS | Mod: CPTII,S$GLB,, | Performed by: STUDENT IN AN ORGANIZED HEALTH CARE EDUCATION/TRAINING PROGRAM

## 2023-09-07 PROCEDURE — 3074F PR MOST RECENT SYSTOLIC BLOOD PRESSURE < 130 MM HG: ICD-10-PCS | Mod: CPTII,S$GLB,, | Performed by: STUDENT IN AN ORGANIZED HEALTH CARE EDUCATION/TRAINING PROGRAM

## 2023-09-07 PROCEDURE — 1159F PR MEDICATION LIST DOCUMENTED IN MEDICAL RECORD: ICD-10-PCS | Mod: CPTII,S$GLB,, | Performed by: STUDENT IN AN ORGANIZED HEALTH CARE EDUCATION/TRAINING PROGRAM

## 2023-09-07 PROCEDURE — 3078F DIAST BP <80 MM HG: CPT | Mod: CPTII,S$GLB,, | Performed by: STUDENT IN AN ORGANIZED HEALTH CARE EDUCATION/TRAINING PROGRAM

## 2023-09-07 PROCEDURE — 99999 PR PBB SHADOW E&M-EST. PATIENT-LVL III: ICD-10-PCS | Mod: PBBFAC,,, | Performed by: STUDENT IN AN ORGANIZED HEALTH CARE EDUCATION/TRAINING PROGRAM

## 2023-09-07 PROCEDURE — 99204 OFFICE O/P NEW MOD 45 MIN: CPT | Mod: S$GLB,,, | Performed by: STUDENT IN AN ORGANIZED HEALTH CARE EDUCATION/TRAINING PROGRAM

## 2023-09-07 PROCEDURE — 1159F MED LIST DOCD IN RCRD: CPT | Mod: CPTII,S$GLB,, | Performed by: STUDENT IN AN ORGANIZED HEALTH CARE EDUCATION/TRAINING PROGRAM

## 2023-09-07 PROCEDURE — 3008F BODY MASS INDEX DOCD: CPT | Mod: CPTII,S$GLB,, | Performed by: STUDENT IN AN ORGANIZED HEALTH CARE EDUCATION/TRAINING PROGRAM

## 2023-09-07 PROCEDURE — 3074F SYST BP LT 130 MM HG: CPT | Mod: CPTII,S$GLB,, | Performed by: STUDENT IN AN ORGANIZED HEALTH CARE EDUCATION/TRAINING PROGRAM

## 2023-09-07 PROCEDURE — 3078F PR MOST RECENT DIASTOLIC BLOOD PRESSURE < 80 MM HG: ICD-10-PCS | Mod: CPTII,S$GLB,, | Performed by: STUDENT IN AN ORGANIZED HEALTH CARE EDUCATION/TRAINING PROGRAM

## 2023-09-07 RX ORDER — AMITRIPTYLINE HYDROCHLORIDE 10 MG/1
10 TABLET, FILM COATED ORAL NIGHTLY
Qty: 60 TABLET | Refills: 4 | Status: SHIPPED | OUTPATIENT
Start: 2023-09-07 | End: 2023-11-07

## 2023-09-07 NOTE — PROGRESS NOTES
Neurology Clinic Note      Date: 9/7/23  Patient Name: Marilu London   MRN: [id number]   PCP: Elliot Gimenez  Referring Provider: Steven Contreras MD    Assessment and Plan:   Marilu London is a 38 y.o. female presenting for evaluation episodes of vertigo and ice pick like headaches in the occipital region with associated migrainous features.  No occipital tenderness and she has not responded to an occipital nerve block in the past.  She does have a history of migraines.    MRI showed nonspecific T2/FLAIR changes that are possibly secondary to migraines.  Overall, her MRI has been unchanged since 2013.     It is possible that her episodes of vertigo are due to vestibular migraines since she does have migrainous features associated with it.  Her occipital headaches are too short-lived to constitute migraines.  However she has no features to suggest occipital neuralgia.    Recommend a trial of Amitriptylline 10mg nightly      Problem List Items Addressed This Visit          Neuro    Nonintractable headache    Migraine without status migrainosus, not intractable       ENT    Vertigo - Primary         Subjective:          HPI:   Ms. Marilu London is a 38 y.o. female with a history of migraines, anxiety, depression, ADHD presenting for evaluation of vertigo and headaches.    First started back in 2013.  Vertigo is episodic and usually lasts a few hours with no identifiable triggers.  Usually during the day but also occurs at night.  She also reports association with nonspecific symptoms - feels like her 'eyes are melting' and her 'body is made of lead'. Also associated with in her fingers and toes, gait instability along with photophobia and phonophobia.  The severe attacks are associated with nausea as well.  Also complains of stabbing headaches in the occipital region bilaterally which typically last 10-15 seconds and associated with nausea and phonophobia.  Has previously received an occipital nerve block  in 2017 with mild relief.  These symptoms resolved spontaneously around 2 years ago without any treatment.  She presents today as they started to recur again since June of this year.  Underwent an MRI of the brain on 08/24 which showed nonspecific T2/FLAIR changes in the frontal lobes, more prominent on the left.  Her MRI is grossly unchanged compared to her prior scan in 2013.    She has also seen ENT.  VNG in January 2013 was normal.    Has a history of migraines which are typically located in the bitemporal regions and associated with nausea, photophobia and phonophobia.  Her migraines are infrequent and usually occur 1-2/year    Possibly had a stroke sometime in 2013.  Was in a grocery store when she started leaning to the left and was speaking gibberish.  Unclear if she had a headache or how long the symptoms lasted. Was seen at [facility]. Not on any antithrombotics.    No history of tobacco or illicit drug use.  No history of DVT/PE.          PAST MEDICAL HISTORY:  Past Medical History:   Diagnosis Date    Dizziness     Endometriosis of uterus     Gastritis     Headache     Mental disorder     Numbness and tingling in both hands        PAST SURGICAL HISTORY:  Past Surgical History:   Procedure Laterality Date    HYSTERECTOMY         CURRENT MEDS:  Current Outpatient Medications   Medication Sig Dispense Refill    clonazePAM (KLONOPIN) 0.5 MG tablet Take 1 tablet by mouth daily as needed.      desvenlafaxine succinate (PRISTIQ) 50 MG Tb24 Take 100 mg by mouth once daily.      dextroamphetamine-amphetamine 10 mg Tab Take 10 mg by mouth once daily.      ibuprofen (ADVIL,MOTRIN) 800 MG tablet Take 1 tablet (800 mg total) by mouth every 6 (six) hours as needed for Pain. (Patient not taking: Reported on 8/7/2023) 20 tablet 0    zolpidem (AMBIEN) 10 mg Tab Take 10 mg by mouth nightly as needed.        No current facility-administered medications for this visit.       ALLERGIES:  Review of patient's allergies  "indicates:  No Known Allergies    FAMILY HISTORY:  Family History   Problem Relation Age of Onset    Diabetes Maternal Grandmother        SOCIAL HISTORY:  Social History     Tobacco Use    Smoking status: Former    Smokeless tobacco: Never   Substance Use Topics    Alcohol use: Yes    Drug use: No       Review of Systems:  12 system review of systems is negative except for the symptoms mentioned in HPI.      Objective:     Vitals:    23 0811   BP: 106/62   Pulse: 73   Weight: 82.6 kg (182 lb)   Height: 5' 2" (1.575 m)     General: NAD, well nourished   Eyes: no tearing, discharge, no erythema   Neck: Supple, full range of motion  Cardiovascular: Warm and well perfused  Lungs: Normal work of breathing  Skin: No rash, lesions, or breakdown on exposed skin  Psychiatry: Mood and affect are appropriate       NEUROLOGICAL EXAMINATION:     MENTAL STATUS   Oriented to person, place, and time.   Follows 2 step commands.   Speech: speech is normal   Level of consciousness: alert    CRANIAL NERVES     CN II   Visual fields full to confrontation.     CN III, IV, VI   Extraocular motions are normal.   Nystagmus: none   Ophthalmoparesis: none    CN V   Facial sensation intact.     CN VII   Facial expression full, symmetric.     CN XI   CN XI normal.     CN XII   CN XII normal.     MOTOR EXAM   Right arm pronator drift: absent    Strength   Right deltoid: 5/5  Left deltoid: 5/5  Right biceps: 5/5  Left biceps: 5/5  Right triceps: 5/5  Left triceps: 5/5  Right wrist flexion: 5/5  Left wrist flexion: 5/5  Right wrist extension: 5/5  Left wrist extension: 5/5  Right interossei: 5/5  Left interossei: 5/5  Right iliopsoas: 5/5  Left iliopsoas: 5/5  Right quadriceps: 5/5  Left quadriceps: 5/5  Right hamstrin/5  Left hamstrin/5  Right glutei: 5/5  Left glutei: 5/5  Right anterior tibial: 5/5  Left anterior tibial: 5/5  Right gastroc: 5/5  Left gastroc: 5/5    REFLEXES     Reflexes   Right brachioradialis: 2+  Left " brachioradialis: 2+  Right biceps: 2+  Left biceps: 2+  Right patellar: 2+  Left patellar: 2+    SENSORY EXAM   Light touch normal.     GAIT AND COORDINATION     Gait  Gait: normal     Coordination   Finger to nose coordination: normal    Tremor   Resting tremor: absent      EMG/NCS (7/25/2016):    NERVE CONDUCTION STUDY SUMMARY:  Bilateral median, radial, and ulnar sensories   are within normal limits.  Right superficial peroneal and sural sensories are   within normal limits.  Bilateral median motors, ulnar motors are within normal   limits.  Right peroneal and right tibial motor are within normal limits.  Right   tibial F wave is within normal limits.  Needle EMG was performed on the   following muscles:  Right deltoid, triceps, biceps, EDC, first dorsal   interossei, gastrocnemius, tibialis anterior, vastus medialis, rectus femoris   and biceps femoris short head.  All examined muscles showed no evidence of   electrical instability.     IMPRESSION:  Normal EMG nerve conduction study.          Abraham Neves MD  Department of Neurology  [facility]

## 2023-09-18 ENCOUNTER — PATIENT MESSAGE (OUTPATIENT)
Dept: PRIMARY CARE CLINIC | Facility: CLINIC | Age: 38
End: 2023-09-18
Payer: COMMERCIAL

## 2023-10-18 ENCOUNTER — PATIENT MESSAGE (OUTPATIENT)
Dept: CARDIOLOGY | Facility: CLINIC | Age: 38
End: 2023-10-18
Payer: COMMERCIAL

## 2023-11-07 ENCOUNTER — OFFICE VISIT (OUTPATIENT)
Dept: NEUROLOGY | Facility: CLINIC | Age: 38
End: 2023-11-07
Payer: COMMERCIAL

## 2023-11-07 DIAGNOSIS — R42 VERTIGO: ICD-10-CM

## 2023-11-07 DIAGNOSIS — R51.9 NONINTRACTABLE HEADACHE, UNSPECIFIED CHRONICITY PATTERN, UNSPECIFIED HEADACHE TYPE: ICD-10-CM

## 2023-11-07 DIAGNOSIS — G43.809 OTHER MIGRAINE WITHOUT STATUS MIGRAINOSUS, NOT INTRACTABLE: ICD-10-CM

## 2023-11-07 PROCEDURE — 99213 OFFICE O/P EST LOW 20 MIN: CPT | Mod: 95,,, | Performed by: STUDENT IN AN ORGANIZED HEALTH CARE EDUCATION/TRAINING PROGRAM

## 2023-11-07 PROCEDURE — 99213 PR OFFICE/OUTPT VISIT, EST, LEVL III, 20-29 MIN: ICD-10-PCS | Mod: 95,,, | Performed by: STUDENT IN AN ORGANIZED HEALTH CARE EDUCATION/TRAINING PROGRAM

## 2023-11-07 RX ORDER — AMITRIPTYLINE HYDROCHLORIDE 25 MG/1
25 TABLET, FILM COATED ORAL NIGHTLY
Qty: 60 TABLET | Refills: 4 | Status: SHIPPED | OUTPATIENT
Start: 2023-11-07 | End: 2024-11-06

## 2023-11-07 NOTE — PROGRESS NOTES
Neurology Clinic Note    The patient location is:  [place]  The chief complaint leading to consultation is:  Migraines    Visit type: audiovisual      13 minutes of total time spent on the encounter, which includes face to face time and non-face to face time preparing to see the patient (eg, review of tests), Obtaining and/or reviewing separately obtained history, Documenting clinical information in the electronic or other health record, Independently interpreting results (not separately reported) and communicating results to the patient/family/caregiver, or Care coordination (not separately reported).     Each patient to whom he or she provides medical services by telemedicine is:  (1) informed of the relationship between the physician and patient and the respective role of any other health care provider with respect to management of the patient; and (2) notified that he or she may decline to receive medical services by telemedicine and may withdraw from such care at any time.      Date: 11/7/23  Patient Name: Marilu London   MRN: [id number]   PCP: Elliot Gimenez  Referring Provider: No ref. provider found    Assessment and Plan:   Marilu London is a 38 y.o. female with a history of mixed headaches, likely vestibular migraines and tension-type headaches.   Recommend increasing amitriptyline to 25 mg nightly.    RTC 3 months.      Problem List Items Addressed This Visit          Neuro    Nonintractable headache    Relevant Medications    amitriptyline (ELAVIL) 25 MG tablet    Migraine without status migrainosus, not intractable    Relevant Medications    amitriptyline (ELAVIL) 25 MG tablet       ENT    Vertigo    Relevant Medications    amitriptyline (ELAVIL) 25 MG tablet         Subjective:     Interval history (11/07/2023):    Patient presents for a follow up.    Continues to have headaches which are now predominantly right sided.  Continues to have associated photophobia and phonophobia although this is  less prominent.  No longer has any occipital headaches or vertigo.  The headaches occur daily with no aggravating or relieving factors.  Denies any vision loss.    Compliant with amitriptyline and tolerating it well.    HPI (09/07/2023):   Ms. Marilu Lodnon is a 38 y.o. female with a history of migraines, anxiety, depression, ADHD presenting for evaluation of vertigo and headaches.     First started back in 2013.  Vertigo is episodic and usually lasts a few hours with no identifiable triggers.  Usually during the day but also occurs at night.  She also reports association with nonspecific symptoms - feels like her 'eyes are melting' and her 'body is made of lead'. Also associated with in her fingers and toes, gait instability along with photophobia and phonophobia.  The severe attacks are associated with nausea as well.  Also complains of stabbing headaches in the occipital region bilaterally which typically last 10-15 seconds and associated with nausea and phonophobia.  Has previously received an occipital nerve block in 2017 with mild relief.  These symptoms resolved spontaneously around 2 years ago without any treatment.  She presents today as they started to recur again since June of this year.  Underwent an MRI of the brain on 08/24 which showed nonspecific T2/FLAIR changes in the frontal lobes, more prominent on the left.  Her MRI is grossly unchanged compared to her prior scan in 2013.     She has also seen ENT.  VNG in January 2013 was normal.     Has a history of migraines which are typically located in the bitemporal regions and associated with nausea, photophobia and phonophobia.  Her migraines are infrequent and usually occur 1-2/year     Possibly had a stroke sometime in 2013.  Was in a grocery store when she started leaning to the left and was speaking gibberish.  Unclear if she had a headache or how long the symptoms lasted. Was seen at [facility]. Not on any antithrombotics.     No history of  tobacco or illicit drug use.  No history of DVT/PE.    PAST MEDICAL HISTORY:  Past Medical History:   Diagnosis Date    Dizziness     Endometriosis of uterus     Gastritis     Headache     Mental disorder     Numbness and tingling in both hands        PAST SURGICAL HISTORY:  Past Surgical History:   Procedure Laterality Date    HYSTERECTOMY         CURRENT MEDS:  Current Outpatient Medications   Medication Sig Dispense Refill    amitriptyline (ELAVIL) 25 MG tablet Take 1 tablet (25 mg total) by mouth every evening. 60 tablet 4    clonazePAM (KLONOPIN) 0.5 MG tablet Take 1 tablet by mouth daily as needed.      dextroamphetamine-amphetamine 10 mg Tab Take 10 mg by mouth once daily.      ibuprofen (ADVIL,MOTRIN) 800 MG tablet Take 1 tablet (800 mg total) by mouth every 6 (six) hours as needed for Pain. (Patient not taking: Reported on 8/7/2023) 20 tablet 0    zolpidem (AMBIEN) 10 mg Tab Take 10 mg by mouth nightly as needed.        No current facility-administered medications for this visit.       ALLERGIES:  Review of patient's allergies indicates:  No Known Allergies    FAMILY HISTORY:  Family History   Problem Relation Age of Onset    Diabetes Maternal Grandmother        SOCIAL HISTORY:  Social History     Tobacco Use    Smoking status: Former    Smokeless tobacco: Never   Substance Use Topics    Alcohol use: Yes    Drug use: No       Review of Systems:  12 system review of systems is negative except for the symptoms mentioned in HPI.      Objective:   There were no vitals filed for this visit.    Exam limited -  televideo visit    General: Well-developed, well-groomed. No apparent distress      Neurologic Exam  The patient is awake, alert and oriented. Language is fluent.  Fund of knowledge and attention are appropriate, able to provide detailed medical history.    Cranial nerves:   Ocular motility is full in all cardinal positions of gaze.   Facial activation is symmetric.   Shoulder elevation is  symmetric.  Tongue protrudes midline.  Exam limited 2/2 televideo visit.    Motor examination   Normal bulk in BUE  No pronator drift.  Exam limited 2/2 televideo visit.    Sensory examination   Deferred - televideo visit    Deep tendon reflexes  Deferred - televideo visit    Gait:   Deferred - televideo visit    Coordination: Finger to nose is normal bilaterally.  Rapid finger movements intact b/l.          Abraham Neves MD  Department of Neurology  [facility]

## 2023-11-08 ENCOUNTER — PATIENT MESSAGE (OUTPATIENT)
Dept: NEUROLOGY | Facility: CLINIC | Age: 38
End: 2023-11-08
Payer: COMMERCIAL

## 2023-12-11 ENCOUNTER — CLINICAL SUPPORT (OUTPATIENT)
Dept: OTHER | Facility: CLINIC | Age: 38
End: 2023-12-11

## 2023-12-11 DIAGNOSIS — Z00.8 ENCOUNTER FOR OTHER GENERAL EXAMINATION: ICD-10-CM

## 2023-12-13 VITALS
BODY MASS INDEX: 29.71 KG/M2 | WEIGHT: 174 LBS | HEIGHT: 64 IN | DIASTOLIC BLOOD PRESSURE: 76 MMHG | SYSTOLIC BLOOD PRESSURE: 123 MMHG

## 2023-12-13 LAB
GLUCOSE SERPL-MCNC: 78 MG/DL (ref 60–140)
HDLC SERPL-MCNC: 52 MG/DL
POC CHOLESTEROL, TOTAL: 130 MG/DL
TRIGL SERPL-MCNC: 44 MG/DL

## 2024-01-23 ENCOUNTER — TELEPHONE (OUTPATIENT)
Dept: NEUROLOGY | Facility: CLINIC | Age: 39
End: 2024-01-23
Payer: COMMERCIAL

## 2024-01-23 ENCOUNTER — PATIENT MESSAGE (OUTPATIENT)
Dept: NEUROLOGY | Facility: CLINIC | Age: 39
End: 2024-01-23
Payer: COMMERCIAL

## 2024-01-23 NOTE — TELEPHONE ENCOUNTER
Staff called pt and could not leave a vm. Provider will not be in the clinic week of 2/12-2/16. Staff cancelled pt's appt.

## 2024-09-19 ENCOUNTER — PATIENT MESSAGE (OUTPATIENT)
Dept: PRIMARY CARE CLINIC | Facility: CLINIC | Age: 39
End: 2024-09-19
Payer: COMMERCIAL